# Patient Record
Sex: FEMALE | Race: WHITE | NOT HISPANIC OR LATINO | Employment: OTHER | URBAN - METROPOLITAN AREA
[De-identification: names, ages, dates, MRNs, and addresses within clinical notes are randomized per-mention and may not be internally consistent; named-entity substitution may affect disease eponyms.]

---

## 2023-04-20 ENCOUNTER — NURSING HOME VISIT (OUTPATIENT)
Dept: POST ACUTE CARE | Facility: EXTERNAL LOCATION | Age: 68
End: 2023-04-20
Payer: MEDICARE

## 2023-04-20 DIAGNOSIS — Z01.89 LABORATORY EXAMINATION: ICD-10-CM

## 2023-04-20 DIAGNOSIS — M97.02XD PERIPROSTHETIC FRACTURE AROUND INTERNAL PROSTHETIC LEFT HIP JOINT, SUBSEQUENT ENCOUNTER: ICD-10-CM

## 2023-04-20 DIAGNOSIS — R56.9 SEIZURE (MULTI): ICD-10-CM

## 2023-04-20 DIAGNOSIS — F32.A DEPRESSION, UNSPECIFIED DEPRESSION TYPE: ICD-10-CM

## 2023-04-20 DIAGNOSIS — Z79.899 ENCOUNTER FOR MEDICATION REVIEW: ICD-10-CM

## 2023-04-20 DIAGNOSIS — R26.2 AMBULATORY DYSFUNCTION: ICD-10-CM

## 2023-04-20 DIAGNOSIS — R53.81 PHYSICAL DECONDITIONING: Primary | ICD-10-CM

## 2023-04-20 DIAGNOSIS — E78.5 HYPERLIPIDEMIA, UNSPECIFIED HYPERLIPIDEMIA TYPE: ICD-10-CM

## 2023-04-20 DIAGNOSIS — S62.102D CLOSED FRACTURE OF LEFT WRIST WITH ROUTINE HEALING, SUBSEQUENT ENCOUNTER: ICD-10-CM

## 2023-04-20 DIAGNOSIS — K21.9 GASTROESOPHAGEAL REFLUX DISEASE WITHOUT ESOPHAGITIS: ICD-10-CM

## 2023-04-20 DIAGNOSIS — N32.81 OVERACTIVE BLADDER: ICD-10-CM

## 2023-04-20 PROCEDURE — 99310 SBSQ NF CARE HIGH MDM 45: CPT | Performed by: NURSE PRACTITIONER

## 2023-04-20 NOTE — LETTER
Patient: Marshall Armenta  : 1955    Encounter Date: 2023    Subjective  Patient ID: Marshall Armenta is a 67 y.o. female who is acute skilled care and presents for initial visit for skilled nursing.    HPI   A 67-year-old female with past medical history significant for GERD, chronic back pain secondary to T12 compression fracture,   anxiety/depression who is presenting emergency department chief concern of a fall and c/o pain on left radial aspect of forearm and left hip pain.  She states that during the fall she did not lose consciousness, did not strike her head, does not have any other associated injury.  She is not currently on any blood thinners. She found to have left radial fracture and left femur periprosthetic  fracture. [facility] discharge to  for rehab. She is resting in bed and appears comfortable. See ROS.     Review of Systems   Constitutional:  Negative for fatigue and fever.   Respiratory:  Negative for apnea, cough, chest tightness and shortness of breath.    Cardiovascular:  Negative for chest pain and palpitations.   Gastrointestinal:  Negative for abdominal pain, nausea and vomiting.   Genitourinary:  Negative for dysuria.   Neurological:  Negative for dizziness.   Psychiatric/Behavioral:  Negative for agitation, behavioral problems and confusion.      Objective  Vitals: T 97.7 - 2023 20:04 Route: Forehead (non-contact)  /75 - 2023 20:04 Position: Sitting l/arm  P 78 - 2023 20:04 Pulse Type: Regular  R 18 - 2023 20:04  O2 97 % - 2023 20:04Method: Room Air  H 64.5 in - 2023 18:21 Method: Lying down  W 97.4 lb - 2023 13:33 Scale: Sitting    Physical Exam  Constitutional:       General: She is not in acute distress.     Appearance: She is not ill-appearing.   HENT:      Head: Normocephalic.      Nose: Nose normal.      Mouth/Throat:      Mouth: Mucous membranes are moist.   Eyes:      General: No scleral icterus.     Extraocular Movements: Extraocular  movements intact.      Conjunctiva/sclera: Conjunctivae normal.   Cardiovascular:      Rate and Rhythm: Normal rate and regular rhythm.      Heart sounds: Normal heart sounds. No murmur heard.  Pulmonary:      Effort: Pulmonary effort is normal.      Breath sounds: Normal breath sounds. No wheezing.   Abdominal:      General: Abdomen is flat. Bowel sounds are normal. There is no distension.      Palpations: Abdomen is soft.      Tenderness: There is no abdominal tenderness. There is no guarding or rebound.   Musculoskeletal:      Right lower leg: No edema.      Left lower leg: No edema.      Comments: ROM limited on left hand and left hip   Skin:     General: Skin is warm and dry.      Coloration: Skin is not jaundiced.   Neurological:      Mental Status: She is alert. Mental status is at baseline.   Psychiatric:         Mood and Affect: Mood normal.         Behavior: Behavior normal.       Lab Results   Component Value Date    WBC CANCELED 04/20/2023    HGB CANCELED 04/20/2023    HCT CANCELED 04/20/2023    PLT CANCELED 04/20/2023    ALT 13 04/15/2023    AST 11 04/15/2023    NA CANCELED 04/20/2023    K CANCELED 04/20/2023    CL CANCELED 04/20/2023    CREATININE CANCELED 04/20/2023    BUN CANCELED 04/20/2023    CO2 CANCELED 04/20/2023    TSH 1.11 05/20/2022    INR 1.2 (H) 04/28/2022    HGBA1C 5.0 02/03/2020       Assessment/Plan  #Physical deconditioning  #Ambulatory dysfunction  - continue PT/OT   #Left radial fracture  -CT of the wrist demonstrates a minimally displaced fracture.  This fractureweek treated closed.  Nonweightbearing to the left wrist.  Patient is able toweight-bear through the elbow.  She would need to be fitted for a platform walker.  - follow up with ortho   #Periprosthetic left hip fracture  - hx of s/p left hip hemiarthroplasty  - weight bearing with platform walker.  - She will need a total of 30 days of prophylaxis with lovenox regardless of the treatment  - c/w Lidocaine External Patch 4 %  daily   #Seizure  - c/w Carbatrol Oral Capsule Extended Release 12 Hour 300 MG TID  #Depression  - c/w Citalopram Hydrobromide Oral Tablet 20 MG daily   - c/w Mirtazapine Oral Tablet 30 MG daily   #GERD  - c/w Omeprazole Oral Capsule Delayed Release 20 MG daily  #HLD  - continue Atorvastatin 10 mg PO daily   #Over active bladder  - c/w Oxybutynin Chloride Oral Tablet 5 MG daily   #Med reviewed  #Lab examined     Time spending for 45 minutes   -reviewed of hospital record via EMR and reconciled medication in [facility] and EMR (d/c summary). Reviewed orders, medications, records, and pertinent labs/x-rays from [facility]. Reviewed VS, BS, O2, etc as per protocol. Reviewed and signed off orders, medications, Labs, x-rays, and current diagnoses in [facility]  -Obtained history  -Performing physical examination  -Counseling and educating patient for pain management   -Ordering medications, lab   -Documenting clinical information in the [facility]   -Care coordinating with nursing staff      Electronically Signed By: KYAW Carrillo-CNP   4/23/23  9:36 PM

## 2023-04-23 PROBLEM — R26.2 AMBULATORY DYSFUNCTION: Status: ACTIVE | Noted: 2023-04-23

## 2023-04-23 PROBLEM — R53.81 PHYSICAL DECONDITIONING: Status: ACTIVE | Noted: 2023-04-23

## 2023-04-23 ASSESSMENT — ENCOUNTER SYMPTOMS
DYSURIA: 0
COUGH: 0
CONFUSION: 0
FEVER: 0
DIZZINESS: 0
SHORTNESS OF BREATH: 0
FATIGUE: 0
NAUSEA: 0
PALPITATIONS: 0
VOMITING: 0
AGITATION: 0
APNEA: 0
CHEST TIGHTNESS: 0
ABDOMINAL PAIN: 0

## 2023-04-24 NOTE — PROGRESS NOTES
Subjective   Patient ID: Marshall Armenta is a 67 y.o. female who is acute skilled care and presents for initial visit for skilled nursing.    HPI   A 67-year-old female with past medical history significant for GERD, chronic back pain secondary to T12 compression fracture,   anxiety/depression who is presenting emergency department chief concern of a fall and c/o pain on left radial aspect of forearm and left hip pain.  She states that during the fall she did not lose consciousness, did not strike her head, does not have any other associated injury.  She is not currently on any blood thinners. She found to have left radial fracture and left femur periprosthetic  fracture. [facility] discharge to  for rehab. She is resting in bed and appears comfortable. See ROS.     Review of Systems   Constitutional:  Negative for fatigue and fever.   Respiratory:  Negative for apnea, cough, chest tightness and shortness of breath.    Cardiovascular:  Negative for chest pain and palpitations.   Gastrointestinal:  Negative for abdominal pain, nausea and vomiting.   Genitourinary:  Negative for dysuria.   Neurological:  Negative for dizziness.   Psychiatric/Behavioral:  Negative for agitation, behavioral problems and confusion.      Objective   Vitals: T 97.7 - 4/20/2023 20:04 Route: Forehead (non-contact)  /75 - 4/20/2023 20:04 Position: Sitting l/arm  P 78 - 4/20/2023 20:04 Pulse Type: Regular  R 18 - 4/20/2023 20:04  O2 97 % - 4/20/2023 20:04Method: Room Air  H 64.5 in - 4/19/2023 18:21 Method: Lying down  W 97.4 lb - 4/20/2023 13:33 Scale: Sitting    Physical Exam  Constitutional:       General: She is not in acute distress.     Appearance: She is not ill-appearing.   HENT:      Head: Normocephalic.      Nose: Nose normal.      Mouth/Throat:      Mouth: Mucous membranes are moist.   Eyes:      General: No scleral icterus.     Extraocular Movements: Extraocular movements intact.      Conjunctiva/sclera: Conjunctivae normal.    Cardiovascular:      Rate and Rhythm: Normal rate and regular rhythm.      Heart sounds: Normal heart sounds. No murmur heard.  Pulmonary:      Effort: Pulmonary effort is normal.      Breath sounds: Normal breath sounds. No wheezing.   Abdominal:      General: Abdomen is flat. Bowel sounds are normal. There is no distension.      Palpations: Abdomen is soft.      Tenderness: There is no abdominal tenderness. There is no guarding or rebound.   Musculoskeletal:      Right lower leg: No edema.      Left lower leg: No edema.      Comments: ROM limited on left hand and left hip   Skin:     General: Skin is warm and dry.      Coloration: Skin is not jaundiced.   Neurological:      Mental Status: She is alert. Mental status is at baseline.   Psychiatric:         Mood and Affect: Mood normal.         Behavior: Behavior normal.       Lab Results   Component Value Date    WBC CANCELED 04/20/2023    HGB CANCELED 04/20/2023    HCT CANCELED 04/20/2023    PLT CANCELED 04/20/2023    ALT 13 04/15/2023    AST 11 04/15/2023    NA CANCELED 04/20/2023    K CANCELED 04/20/2023    CL CANCELED 04/20/2023    CREATININE CANCELED 04/20/2023    BUN CANCELED 04/20/2023    CO2 CANCELED 04/20/2023    TSH 1.11 05/20/2022    INR 1.2 (H) 04/28/2022    HGBA1C 5.0 02/03/2020       Assessment/Plan   #Physical deconditioning  #Ambulatory dysfunction  - continue PT/OT   #Left radial fracture  -CT of the wrist demonstrates a minimally displaced fracture.  This fractureweek treated closed.  Nonweightbearing to the left wrist.  Patient is able toweight-bear through the elbow.  She would need to be fitted for a platform walker.  - follow up with ortho   #Periprosthetic left hip fracture  - hx of s/p left hip hemiarthroplasty  - weight bearing with platform walker.  - She will need a total of 30 days of prophylaxis with lovenox regardless of the treatment  - c/w Lidocaine External Patch 4 % daily   #Seizure  - c/w Carbatrol Oral Capsule Extended Release  12 Hour 300 MG TID  #Depression  - c/w Citalopram Hydrobromide Oral Tablet 20 MG daily   - c/w Mirtazapine Oral Tablet 30 MG daily   #GERD  - c/w Omeprazole Oral Capsule Delayed Release 20 MG daily  #HLD  - continue Atorvastatin 10 mg PO daily   #Over active bladder  - c/w Oxybutynin Chloride Oral Tablet 5 MG daily   #Med reviewed  #Lab examined     Time spending for 45 minutes   -reviewed of hospital record via EMR and reconciled medication in [facility] and EMR (d/c summary). Reviewed orders, medications, records, and pertinent labs/x-rays from [facility]. Reviewed VS, BS, O2, etc as per protocol. Reviewed and signed off orders, medications, Labs, x-rays, and current diagnoses in [facility]  -Obtained history  -Performing physical examination  -Counseling and educating patient for pain management   -Ordering medications, lab   -Documenting clinical information in the [facility]   -Care coordinating with nursing staff

## 2023-05-02 ENCOUNTER — PATIENT OUTREACH (OUTPATIENT)
Dept: CARE COORDINATION | Facility: CLINIC | Age: 68
End: 2023-05-02
Payer: MEDICARE

## 2023-05-02 ENCOUNTER — DOCUMENTATION (OUTPATIENT)
Dept: CARE COORDINATION | Facility: CLINIC | Age: 68
End: 2023-05-02
Payer: MEDICARE

## 2023-10-14 PROBLEM — E78.5 HYPERLIPIDEMIA: Status: ACTIVE | Noted: 2023-10-14

## 2023-10-14 PROBLEM — E55.9 VITAMIN D DEFICIENCY: Status: ACTIVE | Noted: 2023-10-14

## 2023-10-14 PROBLEM — M81.0 OSTEOPOROSIS: Status: ACTIVE | Noted: 2023-10-14

## 2023-10-14 PROBLEM — R51.9 HEADACHE: Status: ACTIVE | Noted: 2023-10-14

## 2023-10-14 PROBLEM — N95.2 VAGINAL ATROPHY: Status: ACTIVE | Noted: 2023-10-14

## 2023-10-14 PROBLEM — S42.309A HUMERUS FRACTURE: Status: ACTIVE | Noted: 2023-10-14

## 2023-10-14 PROBLEM — F84.5 ASPERGER'S DISORDER (HHS-HCC): Status: ACTIVE | Noted: 2023-10-14

## 2023-10-14 PROBLEM — D64.9 ANEMIA: Status: ACTIVE | Noted: 2023-10-14

## 2023-10-14 PROBLEM — K58.0 IRRITABLE BOWEL SYNDROME WITH DIARRHEA: Status: ACTIVE | Noted: 2023-10-14

## 2023-10-14 PROBLEM — R79.89 ABNORMAL CBC: Status: ACTIVE | Noted: 2023-10-14

## 2023-10-14 PROBLEM — K86.2 PANCREATIC CYST (HHS-HCC): Status: ACTIVE | Noted: 2023-10-14

## 2023-10-14 PROBLEM — R01.1 HEART MURMUR: Status: ACTIVE | Noted: 2023-10-14

## 2023-10-14 PROBLEM — R10.33 ABDOMINAL PAIN, PERIUMBILICAL: Status: ACTIVE | Noted: 2023-10-14

## 2023-10-14 PROBLEM — I78.1 NEVUS, NON-NEOPLASTIC: Status: ACTIVE | Noted: 2018-10-25

## 2023-10-14 PROBLEM — G40.409 GRAND MAL SEIZURE (MULTI): Status: ACTIVE | Noted: 2023-10-14

## 2023-10-14 PROBLEM — N85.8 UTERINE MASS: Status: ACTIVE | Noted: 2023-10-14

## 2023-10-14 PROBLEM — R13.10 ODYNOPHAGIA: Status: ACTIVE | Noted: 2023-10-14

## 2023-10-14 PROBLEM — D12.6 TUBULAR ADENOMA OF COLON: Status: ACTIVE | Noted: 2023-10-14

## 2023-10-14 PROBLEM — G25.81 RESTLESS LEG SYNDROME: Status: ACTIVE | Noted: 2023-10-14

## 2023-10-14 PROBLEM — R19.02 ABDOMINAL MASS, LUQ (LEFT UPPER QUADRANT): Status: ACTIVE | Noted: 2023-10-14

## 2023-10-14 PROBLEM — F41.9 ANXIETY DISORDER: Status: ACTIVE | Noted: 2023-10-14

## 2023-10-14 PROBLEM — S52.502A DISTAL RADIUS FRACTURE, LEFT: Status: ACTIVE | Noted: 2023-10-14

## 2023-10-14 PROBLEM — K59.09 CHRONIC CONSTIPATION: Status: ACTIVE | Noted: 2023-10-14

## 2023-10-14 PROBLEM — L82.1 OTHER SEBORRHEIC KERATOSIS: Status: ACTIVE | Noted: 2018-10-25

## 2023-10-14 PROBLEM — S52.572D OTHER INTRAARTICULAR FRACTURE OF LOWER END OF LEFT RADIUS, SUBSEQUENT ENCOUNTER FOR CLOSED FRACTURE WITH ROUTINE HEALING: Status: ACTIVE | Noted: 2023-10-14

## 2023-10-14 PROBLEM — R53.83 FATIGUE: Status: ACTIVE | Noted: 2023-10-14

## 2023-10-14 PROBLEM — N95.1 HOT FLASHES, MENOPAUSAL: Status: ACTIVE | Noted: 2023-10-14

## 2023-10-14 PROBLEM — M97.8XXD: Status: ACTIVE | Noted: 2023-10-14

## 2023-10-14 PROBLEM — Z96.649: Status: ACTIVE | Noted: 2023-10-14

## 2023-10-14 PROBLEM — R13.10 DYSPHAGIA: Status: ACTIVE | Noted: 2023-10-14

## 2023-10-14 PROBLEM — D18.01 HEMANGIOMA OF SKIN AND SUBCUTANEOUS TISSUE: Status: ACTIVE | Noted: 2018-10-25

## 2023-10-14 PROBLEM — M54.9 CHRONIC BACK PAIN: Status: ACTIVE | Noted: 2023-10-14

## 2023-10-14 PROBLEM — M25.532 LEFT WRIST PAIN: Status: ACTIVE | Noted: 2023-10-14

## 2023-10-14 PROBLEM — S52.236D: Status: ACTIVE | Noted: 2023-10-14

## 2023-10-14 PROBLEM — K58.1 IRRITABLE BOWEL SYNDROME WITH CONSTIPATION: Status: ACTIVE | Noted: 2023-10-14

## 2023-10-14 PROBLEM — S52.509D TRAUMATIC CLOSED DISPLACED FRACTURE OF DISTAL END OF RADIUS WITH ROUTINE HEALING: Status: ACTIVE | Noted: 2023-10-14

## 2023-10-14 PROBLEM — L81.4 OTHER MELANIN HYPERPIGMENTATION: Status: ACTIVE | Noted: 2018-10-25

## 2023-10-14 PROBLEM — G89.29 CHRONIC BACK PAIN: Status: ACTIVE | Noted: 2023-10-14

## 2023-10-14 PROBLEM — S22.081A BURST FRACTURE OF T12 VERTEBRA (MULTI): Status: ACTIVE | Noted: 2023-10-14

## 2023-10-14 PROBLEM — M25.512 SHOULDER PAIN, LEFT: Status: ACTIVE | Noted: 2023-10-14

## 2023-10-14 PROBLEM — R31.9 HEMATURIA: Status: ACTIVE | Noted: 2023-10-14

## 2023-10-14 PROBLEM — F32.A DEPRESSION: Status: ACTIVE | Noted: 2023-10-14

## 2023-10-14 PROBLEM — E46 MALNUTRITION (MULTI): Status: ACTIVE | Noted: 2023-10-14

## 2023-10-14 PROBLEM — R10.13 EPIGASTRIC ABDOMINAL PAIN: Status: ACTIVE | Noted: 2023-10-14

## 2023-10-14 PROBLEM — R35.0 INCREASED FREQUENCY OF URINATION: Status: ACTIVE | Noted: 2023-10-14

## 2023-10-14 RX ORDER — ASPIRIN 81 MG/1
TABLET ORAL
COMMUNITY
Start: 2017-06-26 | End: 2023-11-28 | Stop reason: WASHOUT

## 2023-10-14 RX ORDER — RISPERIDONE 0.5 MG/1
0.5 TABLET ORAL NIGHTLY
COMMUNITY
End: 2023-10-16 | Stop reason: SDUPTHER

## 2023-10-14 RX ORDER — LORATADINE 10 MG/1
10 TABLET ORAL DAILY PRN
COMMUNITY
End: 2023-11-28 | Stop reason: WASHOUT

## 2023-10-14 RX ORDER — OMEGA-3 FATTY ACIDS/FISH OIL 340-1000MG
1 CAPSULE ORAL DAILY
COMMUNITY

## 2023-10-14 RX ORDER — PROGESTERONE 100 MG/1
100 CAPSULE ORAL DAILY
COMMUNITY
End: 2023-11-28 | Stop reason: WASHOUT

## 2023-10-14 RX ORDER — MIRTAZAPINE 15 MG/1
15 TABLET, FILM COATED ORAL NIGHTLY
COMMUNITY
End: 2023-10-16 | Stop reason: WASHOUT

## 2023-10-14 RX ORDER — OMEPRAZOLE 20 MG/1
20 CAPSULE, DELAYED RELEASE ORAL DAILY
COMMUNITY
End: 2023-11-28 | Stop reason: WASHOUT

## 2023-10-14 RX ORDER — GABAPENTIN 300 MG/1
300 CAPSULE ORAL NIGHTLY
COMMUNITY
Start: 2021-05-25 | End: 2023-11-28 | Stop reason: WASHOUT

## 2023-10-14 RX ORDER — ADHESIVE BANDAGE
30 BANDAGE TOPICAL NIGHTLY PRN
COMMUNITY
End: 2023-11-28 | Stop reason: WASHOUT

## 2023-10-14 RX ORDER — CARBAMAZEPINE 300 MG/1
300 CAPSULE, EXTENDED RELEASE ORAL 3 TIMES DAILY
COMMUNITY
Start: 2012-10-24 | End: 2024-05-13 | Stop reason: SDUPTHER

## 2023-10-14 RX ORDER — CITALOPRAM 40 MG/1
1 TABLET, FILM COATED ORAL DAILY
COMMUNITY
End: 2023-10-16 | Stop reason: WASHOUT

## 2023-10-14 RX ORDER — ESTRADIOL 0.5 MG/1
1 TABLET ORAL DAILY
COMMUNITY
End: 2023-11-28 | Stop reason: WASHOUT

## 2023-10-14 RX ORDER — TRAZODONE HYDROCHLORIDE 50 MG/1
1 TABLET ORAL NIGHTLY
COMMUNITY
End: 2023-10-16 | Stop reason: WASHOUT

## 2023-10-14 RX ORDER — DOCUSATE SODIUM 100 MG/1
100 CAPSULE, LIQUID FILLED ORAL 2 TIMES DAILY
COMMUNITY
Start: 2022-05-03 | End: 2023-11-28 | Stop reason: WASHOUT

## 2023-10-14 RX ORDER — MIRTAZAPINE 45 MG/1
0.5 TABLET, FILM COATED ORAL NIGHTLY
COMMUNITY
Start: 2021-07-19 | End: 2024-03-11 | Stop reason: WASHOUT

## 2023-10-14 RX ORDER — MULTIVITAMIN
1 TABLET ORAL DAILY
COMMUNITY

## 2023-10-14 RX ORDER — SIMVASTATIN 20 MG/1
1 TABLET, FILM COATED ORAL DAILY
COMMUNITY
Start: 2013-09-30

## 2023-10-14 RX ORDER — PHENAZOPYRIDINE HYDROCHLORIDE 200 MG/1
TABLET, FILM COATED ORAL 2 TIMES DAILY
COMMUNITY
Start: 2023-01-27 | End: 2023-11-28 | Stop reason: WASHOUT

## 2023-10-14 RX ORDER — OMEPRAZOLE 40 MG/1
1 CAPSULE, DELAYED RELEASE ORAL DAILY
COMMUNITY
Start: 2021-11-04 | End: 2023-11-28 | Stop reason: WASHOUT

## 2023-10-14 RX ORDER — POLYETHYLENE GLYCOL 3350 17 G/17G
17 POWDER, FOR SOLUTION ORAL DAILY
COMMUNITY
Start: 2022-05-03 | End: 2023-11-28 | Stop reason: WASHOUT

## 2023-10-14 RX ORDER — ALUMINUM HYDROXIDE, MAGNESIUM HYDROXIDE, AND SIMETHICONE 1200; 120; 1200 MG/30ML; MG/30ML; MG/30ML
SUSPENSION ORAL
COMMUNITY
End: 2023-11-28 | Stop reason: WASHOUT

## 2023-10-14 RX ORDER — CITALOPRAM 20 MG/1
1 TABLET, FILM COATED ORAL DAILY
COMMUNITY
Start: 2022-04-18 | End: 2023-11-28 | Stop reason: SDUPTHER

## 2023-10-14 RX ORDER — OXYBUTYNIN CHLORIDE 10 MG/1
1 TABLET, EXTENDED RELEASE ORAL DAILY
COMMUNITY

## 2023-10-14 RX ORDER — ACETAMINOPHEN 325 MG/1
650 TABLET ORAL EVERY 4 HOURS PRN
COMMUNITY
Start: 2020-02-03

## 2023-10-16 ENCOUNTER — TELEMEDICINE (OUTPATIENT)
Dept: BEHAVIORAL HEALTH | Facility: CLINIC | Age: 68
End: 2023-10-16
Payer: MEDICARE

## 2023-10-16 DIAGNOSIS — F41.1 GAD (GENERALIZED ANXIETY DISORDER): ICD-10-CM

## 2023-10-16 DIAGNOSIS — F33.40 RECURRENT MAJOR DEPRESSIVE DISORDER, IN REMISSION (CMS-HCC): ICD-10-CM

## 2023-10-16 DIAGNOSIS — F42.9 OBSESSIVE-COMPULSIVE DISORDER, UNSPECIFIED TYPE: ICD-10-CM

## 2023-10-16 PROBLEM — F32.9 MAJOR DEPRESSION: Status: ACTIVE | Noted: 2023-10-14

## 2023-10-16 PROCEDURE — 99214 OFFICE O/P EST MOD 30 MIN: CPT | Performed by: PSYCHIATRY & NEUROLOGY

## 2023-10-16 RX ORDER — RISPERIDONE 0.25 MG/1
0.75 TABLET ORAL NIGHTLY
Qty: 90 TABLET | Refills: 1 | Status: SHIPPED | OUTPATIENT
Start: 2023-10-16 | End: 2023-11-28 | Stop reason: SDUPTHER

## 2023-10-16 NOTE — PATIENT INSTRUCTIONS
Safety plan reviewed with patient. If there are any thoughts of harming your self, harming others, concerning worsening of your mental health symptoms or concerning medication side effects, please call [phone]. [phone] or reports to the nearest emergency room.   Reviewed r/b/a of medications, side effects reviewed, patient gives informed consent for each medication. Patient understands to report to the er/urgent care if there are concerning medication side effects.

## 2023-10-16 NOTE — PROGRESS NOTES
Outpatient Psychiatry          Reason for Visit: follow up for PDD, mild-mod; OCD; AIYANA; r/o cog disorder . Caregiver notes she is more social, doing something every day, decreased excessive calling with complaints.      HPI:69 y/o single female who has been treated for depression and anxiety during her adult life.. She is tolerating remeron and Risperdal and no SE.. Denies med SE. Estimates mood is good about 75% of the time. Continues to be  more active, going on walks, more social and engaging more in activities, however she still finds that she often has to push self to do things,  Does word trivia and word games.   She is writing down activities that she plans to do.   There is decreased perseveration on worry. Caregiver reports excessive calls reduced by 80%.    She is taking balance class.   Sleeps well most nights, appetite is ok. she is holding at 82 pounds   There is chronic worry- BM, worries she may fall, will not get to the bathroom on time. somatic complaints   There is no death wish or SI, hallucinations, paranoia, alfonso, wants things to be perfect and neat , counts to 50, has to check things many times, takes pills in and out of pill box.    she does not want to go to a nursing home.  2022 at ER as she she had inadvertently OD on tegretol, 2022 she fell and bumper her head which resulted in an ER visit.    PAST PSYCH H/O: last saw a psychiatrist over  saw an NP in May 2021 X 1 and was prescribed abilify which she did not take   PAST PSYCH HOSP: none   PAST SELF HARM: none   past psych meds: abilify, celexa (since ) , trazadone , gabapentin , elavil , buspar , risperdal     DANI: Denies all substance abuse   FAMILY PSYCH H/O: brother - schizophrenia , alcohol abuse     reviewed past med h/o, meds, allergies    SOCIAL H/O: parents were optometrists, one living brother -, one  brother. associates degree, worked for parents in the past- secretarial (had difficulty working as  she was frequently depressed/ tired). Never , no children. She has never been able to be fully independent - unclear what the etiology is.        Current Medications:    Current Outpatient Medications:     acetaminophen (Tylenol) 325 mg tablet, Take 2 tablets (650 mg) by mouth every 4 hours if needed., Disp: , Rfl:     alum-mag hydroxide-simeth (Antacid) 200-200-20 mg/5 mL oral suspension, Take by mouth., Disp: , Rfl:     aspirin 81 mg EC tablet, Take by mouth., Disp: , Rfl:     carBAMazepine (CarbatroL) 300 mg 12 hr capsule, Take 1 capsule (300 mg) by mouth 3 times a day., Disp: , Rfl:     citalopram (CeleXA) 20 mg tablet, Take 1 tablet (20 mg) by mouth once daily., Disp: , Rfl:     citalopram (CeleXA) 40 mg tablet, Take 1 tablet (40 mg) by mouth once daily., Disp: , Rfl:     docusate sodium (Colace) 100 mg capsule, Take 1 capsule (100 mg) by mouth twice a day., Disp: , Rfl:     estradiol (Estrace) 0.5 mg tablet, Take 1 tablet (0.5 mg) by mouth once daily., Disp: , Rfl:     gabapentin (Neurontin) 300 mg capsule, Take 1 capsule (300 mg) by mouth once daily at bedtime.  CAN INCREASE TO UP TO 1 CAPSULE THREE TIMES DAILY, Disp: , Rfl:     loratadine (Claritin) 10 mg tablet, Take 1 tablet (10 mg) by mouth once daily as needed., Disp: , Rfl:     magnesium hydroxide (Milk of Magnesia) 400 mg/5 mL suspension, Take 30 mL by mouth as needed at bedtime., Disp: , Rfl:     mirtazapine (Remeron) 15 mg tablet, Take 1 tablet (15 mg) by mouth once daily at bedtime., Disp: , Rfl:     mirtazapine (Remeron) 45 mg tablet, Take 0.5 tablets (22.5 mg) by mouth once daily at bedtime., Disp: , Rfl:     multivitamin tablet, Take 1 tablet by mouth once daily., Disp: , Rfl:     omega-3 fatty acids-fish oil (Fish OiL) 340-1,000 mg capsule, Take 1 capsule by mouth once daily., Disp: , Rfl:     omeprazole (PriLOSEC) 20 mg DR capsule, Take 1 capsule (20 mg) by mouth once daily., Disp: , Rfl:     omeprazole (PriLOSEC) 40 mg DR capsule, Take  1 capsule (40 mg) by mouth once daily., Disp: , Rfl:     oxybutynin XL (Ditropan-XL) 10 mg 24 hr tablet, Take 1 tablet (10 mg) by mouth once daily., Disp: , Rfl:     phenazopyridine (Pyridium) 200 mg tablet, Take by mouth twice a day., Disp: , Rfl:     polyethylene glycol (Glycolax, Miralax) 17 gram/dose powder, Take 17 g by mouth once daily., Disp: , Rfl:     progesterone (Prometrium) 100 mg capsule, Take 1 capsule (100 mg) by mouth once daily., Disp: , Rfl:     raNITIdine (Zantac) 150 mg tablet, Take 1 tablet (150 mg) by mouth once daily at bedtime., Disp: , Rfl:     risperiDONE (RisperDAL) 0.5 mg tablet, Take 1 tablet (0.5 mg) by mouth once daily at bedtime., Disp: , Rfl:     simvastatin (Zocor) 20 mg tablet, Take 1 tablet (20 mg) by mouth once daily., Disp: , Rfl:     traZODone (Desyrel) 50 mg tablet, Take 1 tablet (50 mg) by mouth once daily at bedtime., Disp: , Rfl:   Medical History:  Past Medical History:   Diagnosis Date    Asymptomatic menopausal state     Menopause    Epilepsy, unspecified, not intractable, without status epilepticus (CMS/[facility]) 11/07/2022    Epilepsy    Fracture of right shoulder girdle, part unspecified, initial encounter for closed fracture     Fracture of right shoulder     Surgical History:  Past Surgical History:   Procedure Laterality Date    KNEE ARTHROSCOPY W/ DEBRIDEMENT  02/13/2014    Arthroscopy Knee    MR HEAD ANGIO WO IV CONTRAST  12/15/2022    MR HEAD ANGIO WO IV CONTRAST 12/15/2022 [facility]CTOR OFF[facility]    MR NECK ANGIO WO IV CONTRAST  12/15/2022    MR NECK ANGIO WO IV CONTRAST 12/15/2022 DOCTOR OFFICE LEGACY    TONSILLECTOMY  02/13/2014    Tonsillectomy With Adenoidectomy     Family History:  Family History   Problem Relation Name Age of Onset    Hyperlipidemia Mother      Hypertension Mother      Irritable bowel syndrome Mother       Social History:  Social History     Socioeconomic History    Marital status: Single     Spouse name: Not on file    Number of children: Not on  file    Years of education: Not on file    Highest education level: Not on file   Occupational History    Not on file   Tobacco Use    Smoking status: Not on file    Smokeless tobacco: Not on file   Substance and Sexual Activity    Alcohol use: Not on file    Drug use: Not on file    Sexual activity: Not on file   Other Topics Concern    Not on file   Social History Narrative    Not on file     Social Determinants of Health     Financial Resource Strain: Not on file   Food Insecurity: Not on file   Transportation Needs: Not on file   Physical Activity: Not on file   Stress: Not on file   Social Connections: Not on file   Intimate Partner Violence: Not on file   Housing Stability: Not on file       Medical Review Of Systems:  Pertinent items are noted in HPI.    Psychiatric Review Of Systems:  Psychiatric ROS - Adult  Anxiety: General Anxiety Disorder (AIYANA)AIYANA Behaviors: difficult to control worry, excessive anxiety/worry, difficulty concentrating, and irritability  Depression: most of the time mood is ok, able to enjoy things   Delirium: negative  Psychosis: negative  Codi: negative  Safety Issues: none  Psychiatric ROS Comment: less persistent rumination/worry  Disordered Eating Symptoms:n/a   Inattentive Symptoms: n/a    Hyperactive/Impulsive Symptoms:n/a     Trauma Symptoms:n/a       There were no vitals taken for this visit.     LABS  Lab Results   Component Value Date    TSH 1.11 05/20/2022    [id number] 1,582 (H) 04/27/2022        Chemistry    Lab Results   Component Value Date/Time    NA CANCELED 04/20/2023 0204    K CANCELED 04/20/2023 0204    CL CANCELED 04/20/2023 0204    CO2 CANCELED 04/20/2023 0204    BUN CANCELED 04/20/2023 0204    CREATININE CANCELED 04/20/2023 0204    Lab Results   Component Value Date/Time    CALCIUM CANCELED 04/20/2023 0204    ALKPHOS 56 04/15/2023 1508    AST 11 04/15/2023 1508    ALT 13 04/15/2023 1508    BILITOT 0.3 04/15/2023 1508        ains abnormal data LIPID PANEL  BASIC  Order: [id number]  Component  Ref Range & Units 1 mo ago Resulting Agency   Cholesterol, Total  <200 mg/dL 203 [facility]  [facility]   Comment: <200 mg/dL, Desirable   200-239 mg/dL, Borderline high  >239 mg/dL, High   Triglyceride  <150 mg/dL 50 [facility]   Comment: <150 mg/dL, Normal   150-199 mg/dL, Borderline high   200-499 mg/dL, High  >499 mg/dL, Very high   HDL Cholesterol  >39 mg/dL 99 [facility]   Comment:  40-59 mg/dL, Acceptable  >59 mg/dL, High: Negative risk factor for coronary heart disease  <40 mg/dL, Low: Positive risk factor for coronary heart disease   Non HDL Cholesterol  <130 mg/dL 104 [facility]   Comment: <130 mg/dL, Optimal   130-159 mg/dL, Near optimal/above optimal   160-189 mg/dL, Borderline high   190-219 mg/dL, High  >219 mg/dL, Very high  Secondary prevention optimal non HDL Cholesterol levels are recommended to be <100 mg/dL   Fasting Time  hrs 12 [facility]   VLDL Cholesterol  <30 mg/dL 10 [facility]   TC:HDL Ratio  <5.10 2.05 [facility]   LDL Cholesterol  <100 mg/dL 94 [facility]   Comment: <100 mg/dL, Optimal   100-129 mg/dL, Near optimal/above optimal   130-159 mg/dL, Borderline high   160-189 mg/dL, High  >189 mg/dL, Very high  Secondary prevention optimal LDL Cholesterol levels are recommended to be < 70 mg/dL   LDL:HDL Ratio  <2.54 0.95 [facility]          Specimen Collected: 09/02/23 10:10       Objective   Mental Status Exam:  Mental Status Examination  General Appearance: Well groomed, appropriate eye contact.  Gait/Station: n/a  Speech: Normal rate, volume, prosody  Mood: fine  Affect: Constricted  Thought Process: Linear, goal directed  Thought Associations: Moderate loosening of associations  Thought Content: normal  Perception: No perceptual abnormalities noted  Level of Consciousness:  Alert  Orientation: Alert and oriented to person, place, time and situation  Attention and Concentration: Intact  Recent Memory: fair   Remote Memory: caregiver helps with historical details   Executive function: needs assistance with IADLs   Language: no clear deficits, at baseline   Fund of knowledge: fair   Insight :fair   Judgment: fair         Assessment/Plan   67 y/o single female who presented 7/19/2021 with a long h/o excessive worry and there are associated somatic symptoms , OCD symptoms, depression, fatigue. She is disabled due to functional impairment - not able to be fully independent (? cognitive disorder, autistic spectrum ? ). Chronic complaints include anxiety/depression, fatigue, poor motivation. Addition of remeron had been partially helpful with mood and anxiety and recent addition of risperdal has helped with mood/negative rumination. The patient would like to increase risperdal to evaluate if there is further improvement in mood and anxiety. No med SE, no involuntary abnormal movements. There is no psychosis, death wish, SI, HI. She lives in [facility] and her friend Raul is very helpful.     She in on tegretol for seizures, recently saw neurology.   She is a poor historian and her friend clarifies historical details.       dx: PDD, mild-mod; OCD; AIYANA; r/o cog disorder     1) continue remeron 30 mg at night , citalopram 20 mg , increase risperdal to 0.75 mg at night, reviewed r/b/a including metabolic monitoring and TD risk , patient gives IC   2) consider neuropsych testing to characterize cognitive difficulties   3) available labs reviewed, asked that labs forwarded to me as there was recent lipid panel completed   4) f/u 6 weeks , suggest starting counseling (recs given)   Patient Active Problem List   Diagnosis    Physical deconditioning    Ambulatory dysfunction    Abnormal CBC    Anemia    Anxiety disorder    Asperger's disorder    Chronic back pain    Closed nondisplaced oblique fracture of  shaft of ulna with routine healing    Depression    Dysphagia    Abdominal pain, periumbilical    Epigastric abdominal pain    Fatigue    Grand mal seizure (CMS/HCC)    Headache    Heart murmur    Hemangioma of skin and subcutaneous tissue    Hematuria    Hot flashes, menopausal    Humerus fracture    Hyperlipidemia    Increased frequency of urination    Chronic constipation    Irritable bowel syndrome with constipation    Irritable bowel syndrome with diarrhea    Malnutrition (CMS/HCC)    Nevus, non-neoplastic    Osteoporosis    Other melanin hyperpigmentation    Other seborrheic keratosis    Pancreatic cyst    Restless leg syndrome    Shoulder pain, left    Closed traumatic displaced fracture of distal end of radius with routine healing, subsequent encounter    Tubular adenoma of colon    Abdominal mass, LUQ (left upper quadrant)    Uterine mass    Vaginal atrophy    Vitamin D deficiency    Burst fracture of T12 vertebra (CMS/HCC)    Distal radius fracture, left    Left wrist pain    Odynophagia    Other intraarticular fracture of lower end of left radius, subsequent encounter for closed fracture with routine healing    Periprosthetic hip fracture, subsequent encounter       Follow-up plan for depression was discussed with patient    Review with patient: Treatment plan reviewed with the patient.  Medication risks/benefit reviewed with the patient  Psychiatric Risk Assessment  Violence Risk Assessment: none  Acute Risk of Harm to Others is Considered: low   Suicide Risk Assessment: age > 65 yrs old and   Protective Factors against Suicide: adherence to  treatment, moral objections to suicide, and social support/connectedness  Acute Risk of Harm to Self is Considered: low    Rukhsana Kim DO

## 2023-11-28 ENCOUNTER — TELEMEDICINE (OUTPATIENT)
Dept: BEHAVIORAL HEALTH | Facility: CLINIC | Age: 68
End: 2023-11-28
Payer: MEDICARE

## 2023-11-28 DIAGNOSIS — F41.1 GAD (GENERALIZED ANXIETY DISORDER): ICD-10-CM

## 2023-11-28 DIAGNOSIS — F42.9 OBSESSIVE-COMPULSIVE DISORDER, UNSPECIFIED TYPE: ICD-10-CM

## 2023-11-28 DIAGNOSIS — F33.40 RECURRENT MAJOR DEPRESSIVE DISORDER, IN REMISSION (CMS-HCC): ICD-10-CM

## 2023-11-28 DIAGNOSIS — F33.9 RECURRENT MAJOR DEPRESSIVE DISORDER, REMISSION STATUS UNSPECIFIED (CMS-HCC): ICD-10-CM

## 2023-11-28 PROCEDURE — 99214 OFFICE O/P EST MOD 30 MIN: CPT | Performed by: PSYCHIATRY & NEUROLOGY

## 2023-11-28 RX ORDER — RISPERIDONE 0.25 MG/1
0.75 TABLET ORAL NIGHTLY
Qty: 90 TABLET | Refills: 2 | Status: SHIPPED | OUTPATIENT
Start: 2023-11-28 | End: 2024-03-11 | Stop reason: SDUPTHER

## 2023-11-28 RX ORDER — CITALOPRAM 20 MG/1
20 TABLET, FILM COATED ORAL DAILY
Qty: 30 TABLET | Refills: 2 | Status: SHIPPED | OUTPATIENT
Start: 2023-11-28 | End: 2024-03-11 | Stop reason: SDUPTHER

## 2023-11-28 NOTE — PATIENT INSTRUCTIONS
- please follow up in about 8 weeks  -no treatment changes.     -If there are any thoughts of harming your self, harming others, concerning worsening of your mental health symptoms or concerning medication side effects, please call [phone] or reports to the nearest emergency room.

## 2023-11-28 NOTE — PROGRESS NOTES
"Outpatient Psychiatry          Reason for Visit: follow up for PDD, mild-mod; OCD; AIYANA; r/o cog disorder .  \"I am pretty much the same, doing more things\" . Caregiver notes she is laughing, talking, does not call others excessively with ruminative/perseverative thought content      HPI:67 y/o single female who has been treated for depression and anxiety during her adult life.. She is tolerating remeron and Risperdal and no SE. Mood has been fine and not feeling down persistently. States she is lazy.     Continues to be  more active, going on walks, more social and engaging more in activities.  Does word trivia and word games. Continues in balance class .  She even ran a trivia activity when the leader did not show up.   There is still chronic worry which she feels impacts her motivation to do things.    Denies new health issues.   Sleeps well , appetite is ok. she is holding at 82 pounds     There is no death wish or SI, hallucinations, paranoia, alfonso, wants things to be perfect and neat , counts to 50, has to check things many times, takes pills in and out of pill box.    she does not want to go to a nursing home.  2022 at ER as she she had inadvertently OD on tegretol, 2022 she fell and bumper her head which resulted in an ER visit.    PAST PSYCH H/O: last saw a psychiatrist over  saw an NP in May 2021 X 1 and was prescribed abilify which she did not take   PAST PSYCH HOSP: none   PAST SELF HARM: none   past psych meds: abilify, celexa (since ) , trazadone , gabapentin , elavil , buspar , risperdal     DANI: Denies all substance abuse   FAMILY PSYCH H/O: brother - schizophrenia , alcohol abuse     reviewed past med h/o, meds, allergies    SOCIAL H/O: parents were optometrists, one living brother -, one  brother. 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 Never , no children. She has never been " able to be fully independent - unclear what the etiology is.        Current Medications:    Current Outpatient Medications:     acetaminophen (Tylenol) 325 mg tablet, Take 2 tablets (650 mg) by mouth every 4 hours if needed., Disp: , Rfl:     alum-mag hydroxide-simeth (Antacid) 200-200-20 mg/5 mL oral suspension, Take by mouth., Disp: , Rfl:     aspirin 81 mg EC tablet, Take by mouth., Disp: , Rfl:     carBAMazepine (CarbatroL) 300 mg 12 hr capsule, Take 1 capsule (300 mg) by mouth 3 times a day., Disp: , Rfl:     citalopram (CeleXA) 20 mg tablet, Take 1 tablet (20 mg) by mouth once daily., Disp: , Rfl:     docusate sodium (Colace) 100 mg capsule, Take 1 capsule (100 mg) by mouth twice a day., Disp: , Rfl:     estradiol (Estrace) 0.5 mg tablet, Take 1 tablet (0.5 mg) by mouth once daily., Disp: , Rfl:     gabapentin (Neurontin) 300 mg capsule, Take 1 capsule (300 mg) by mouth once daily at bedtime.  CAN INCREASE TO UP TO 1 CAPSULE THREE TIMES DAILY, Disp: , Rfl:     loratadine (Claritin) 10 mg tablet, Take 1 tablet (10 mg) by mouth once daily as needed., Disp: , Rfl:     magnesium hydroxide (Milk of Magnesia) 400 mg/5 mL suspension, Take 30 mL by mouth as needed at bedtime., Disp: , Rfl:     mirtazapine (Remeron) 45 mg tablet, Take 0.5 tablets (22.5 mg) by mouth once daily at bedtime., Disp: , Rfl:     multivitamin tablet, Take 1 tablet by mouth once daily., Disp: , Rfl:     omega-3 fatty acids-fish oil (Fish OiL) 340-1,000 mg capsule, Take 1 capsule by mouth once daily., Disp: , Rfl:     omeprazole (PriLOSEC) 20 mg DR capsule, Take 1 capsule (20 mg) by mouth once daily., Disp: , Rfl:     omeprazole (PriLOSEC) 40 mg DR capsule, Take 1 capsule (40 mg) by mouth once daily., Disp: , Rfl:     oxybutynin XL (Ditropan-XL) 10 mg 24 hr tablet, Take 1 tablet (10 mg) by mouth once daily., Disp: , Rfl:     phenazopyridine (Pyridium) 200 mg tablet, Take by mouth twice a day., Disp: , Rfl:     polyethylene glycol (Glycolax,  Miralax) 17 gram/dose powder, Take 17 g by mouth once daily., Disp: , Rfl:     progesterone (Prometrium) 100 mg capsule, Take 1 capsule (100 mg) by mouth once daily., Disp: , Rfl:     raNITIdine (Zantac) 150 mg tablet, Take 1 tablet (150 mg) by mouth once daily at bedtime., Disp: , Rfl:     risperiDONE (RisperDAL) 0.25 mg tablet, Take 3 tablets (0.75 mg) by mouth once daily at bedtime., Disp: 90 tablet, Rfl: 1    simvastatin (Zocor) 20 mg tablet, Take 1 tablet (20 mg) by mouth once daily., Disp: , Rfl:   Medical History:  Past Medical History:   Diagnosis Date    Asymptomatic menopausal state     Menopause    Epilepsy, unspecified, not intractable, without status epilepticus (CMS/[facility]) 11/07/2022    Epilepsy    Fracture of right shoulder girdle, part unspecified, initial encounter for closed fracture     Fracture of right shoulder     Surgical History:  Past Surgical History:   Procedure Laterality Date    KNEE ARTHROSCOPY W/ DEBRIDEMENT  02/13/2014    Arthroscopy Knee    MR HEAD ANGIO WO IV CONTRAST  12/15/2022    MR HEAD ANGIO WO IV CONTRAST 12/15/2022 [facility]    MR NECK ANGIO WO IV CONTRAST  12/15/2022    MR NECK ANGIO WO IV CONTRAST 12/15/2022 DOCTOR OFFICE [facility]    TONSILLECTOMY  02/13/2014    Tonsillectomy With Adenoidectomy     Family History:  Family History   Problem Relation Name Age of Onset    Hyperlipidemia Mother      Hypertension Mother      Irritable bowel syndrome Mother       Social History:  Social History     Socioeconomic History    Marital status: Single     Spouse name: Not on file    Number of children: Not on file    Years of education: Not on file    Highest education level: Not on file   Occupational History    Not on file   Tobacco Use    Smoking status: Never    Smokeless tobacco: Never   Substance and Sexual Activity    Alcohol use: Not on file    Drug use: Not on file    Sexual activity: Not on file   Other Topics Concern    Not on file   Social History Narrative    Not on  file     Social Determinants of Health     Financial Resource Strain: Not on file   Food Insecurity: Not on file   Transportation Needs: Not on file   Physical Activity: Not on file   Stress: Not on file   Social Connections: Not on file   Intimate Partner Violence: Not on file   Housing Stability: Not on file       Medical Review Of Systems:  Pertinent items are noted in HPI.    Psychiatric Review Of Systems:  Psychiatric ROS - Adult  Anxiety: General Anxiety Disorder (AIYANA)AIYANA Behaviors: difficult to control worry, excessive anxiety/worry, difficulty concentrating, and irritability  Depression: most of the time mood is ok, able to enjoy things   Delirium: negative  Psychosis: negative  Codi: negative  Safety Issues: none  Psychiatric ROS Comment: less persistent rumination/worry  Disordered Eating Symptoms:n/a   Inattentive Symptoms: n/a    Hyperactive/Impulsive Symptoms:n/a     Trauma Symptoms:n/a       There were no vitals taken for this visit.     LABS  Lab Results   Component Value Date    TSH 1.11 05/20/2022    WWFLIRTB35 1,582 (H) 04/27/2022        Chemistry    Lab Results   Component Value Date/Time    NA CANCELED 04/20/2023 0204    K CANCELED 04/20/2023 0204    CL CANCELED 04/20/2023 0204    CO2 CANCELED 04/20/2023 0204    BUN CANCELED 04/20/2023 0204    CREATININE CANCELED 04/20/2023 0204    Lab Results   Component Value Date/Time    CALCIUM CANCELED 04/20/2023 0204    ALKPHOS 56 04/15/2023 1508    AST 11 04/15/2023 1508    ALT 13 04/15/2023 1508    BILITOT 0.3 04/15/2023 1508        ains abnormal data LIPID PANEL BASIC  Order: [id number]  Component  Ref Range & Units 1 mo ago Resulting Agency   Cholesterol, Total  <200 mg/dL 203 [facility]   Comment: <200 mg/dL, Desirable   200-239 mg/dL, Borderline high  >239 mg/dL, High   Triglyceride  <150 mg/dL 50 [facility]   Comment: <150 mg/dL, Normal   150-199 mg/dL, Borderline high   200-499 mg/dL, High  >499  mg/dL, Very high   HDL Cholesterol  >39 mg/dL 99 [facility]   Comment:  40-59 mg/dL, Acceptable  >59 mg/dL, High: Negative risk factor for coronary heart disease  <40 mg/dL, Low: Positive risk factor for coronary heart disease   Non HDL Cholesterol  <130 mg/dL 104 [facility]   Comment: <130 mg/dL, Optimal   130-159 mg/dL, Near optimal/above optimal   160-189 mg/dL, Borderline high   190-219 mg/dL, High  >219 mg/dL, Very high  Secondary prevention optimal non HDL Cholesterol levels are recommended to be <100 mg/dL   Fasting Time  hrs 12 [facility]   VLDL Cholesterol  <30 mg/dL 10 [facility]   TC:HDL Ratio  <5.10 2.05 [facility]   LDL Cholesterol  <100 mg/dL 94 [facility]   Comment: <100 mg/dL, Optimal   100-129 mg/dL, Near optimal/above optimal   130-159 mg/dL, Borderline high   160-189 mg/dL, High  >189 mg/dL, Very high  Secondary prevention optimal LDL Cholesterol levels are recommended to be < 70 mg/dL   LDL:HDL Ratio  <2.54 0.95 [facility]          Specimen Collected: 09/02/23 10:10         Objective     Mental Status Examination  General Appearance: Well groomed, appropriate eye contact.  Gait/Station: n/a  Speech: Normal rate, volume, prosody  Mood: fine  Affect: pleasant, full range   Thought Process: Linear, goal directed  Thought Associations:- intact   Thought Content: normal  Perception: No perceptual abnormalities noted  Level of Consciousness: Alert  Orientation: Alert and oriented to person, place, time and situation  Attention and Concentration: Intact  Recent Memory: fair   Remote Memory: caregiver helps with historical details   Executive function: needs assistance with IADLs   Language: no clear deficits, at baseline   Fund of knowledge: fair   Insight :fair   Judgment: fair         Assessment/Plan   69 y/o single female who presented 7/19/2021 with a long h/o  excessive worry and there are associated somatic symptoms , OCD symptoms, depression, fatigue. She is disabled due to functional impairment - not able to be fully independent (? cognitive disorder, autistic spectrum ? ). Chronic complaints include anxiety/depression, fatigue, poor motivation. Addition of remeron had been partially helpful with mood and anxiety and recent addition of risperdal has helped with mood/negative rumination. No treatment changes at this time. No med SE, no involuntary abnormal movements. There is no psychosis, death wish, SI, HI. She lives in [facility] and her friend Raul is very helpful.     She in on tegretol for seizures, recently saw neurology.   She is a poor historian and her friend clarifies historical details.       dx: PDD, mild-mod; OCD; AIYANA; r/o cog disorder     1) continue remeron 30 mg at night , citalopram 20 mg ,  risperdal 0.75 mg at night, reviewed r/b/a including metabolic monitoring and TD risk , patient gives IC   2) consider neuropsych testing to characterize cognitive difficulties   3) available labs reviewed  4) f/u 8 weeks   Patient Active Problem List   Diagnosis    Physical deconditioning    Ambulatory dysfunction    Abnormal CBC    Anemia    Anxiety disorder    Asperger's disorder    Chronic back pain    Closed nondisplaced oblique fracture of shaft of ulna with routine healing    Major depression    Dysphagia    Abdominal pain, periumbilical    Epigastric abdominal pain    Fatigue    Grand mal seizure (CMS/HCC)    Headache    Heart murmur    Hemangioma of skin and subcutaneous tissue    Hematuria    Hot flashes, menopausal    Humerus fracture    Hyperlipidemia    Increased frequency of urination    Chronic constipation    Irritable bowel syndrome with constipation    Irritable bowel syndrome with diarrhea    Malnutrition (CMS/HCC)    Nevus, non-neoplastic    Osteoporosis    Other melanin hyperpigmentation    Other seborrheic keratosis    Pancreatic cyst    Restless  leg syndrome    Shoulder pain, left    Closed traumatic displaced fracture of distal end of radius with routine healing, subsequent encounter    Tubular adenoma of colon    Abdominal mass, LUQ (left upper quadrant)    Uterine mass    Vaginal atrophy    Vitamin D deficiency    Burst fracture of T12 vertebra (CMS/HCC)    Distal radius fracture, left    Left wrist pain    Odynophagia    Other intraarticular fracture of lower end of left radius, subsequent encounter for closed fracture with routine healing    Periprosthetic hip fracture, subsequent encounter    AIYANA (generalized anxiety disorder)    OCD (obsessive compulsive disorder)       Follow-up plan for depression was discussed with patient    Review with patient: Treatment plan reviewed with the patient.  Medication risks/benefit reviewed with the patient  Psychiatric Risk Assessment  Violence Risk Assessment: none  Acute Risk of Harm to Others is Considered: low   Suicide Risk Assessment: age > 65 yrs old and   Protective Factors against Suicide: adherence to  treatment, moral objections to suicide, and social support/connectedness  Acute Risk of Harm to Self is Considered: low    Rukhsana Kim, DO

## 2024-01-23 ENCOUNTER — TELEMEDICINE (OUTPATIENT)
Dept: BEHAVIORAL HEALTH | Facility: CLINIC | Age: 69
End: 2024-01-23
Payer: MEDICARE

## 2024-01-23 DIAGNOSIS — F41.1 GAD (GENERALIZED ANXIETY DISORDER): ICD-10-CM

## 2024-01-23 DIAGNOSIS — F33.9 RECURRENT MAJOR DEPRESSIVE DISORDER, REMISSION STATUS UNSPECIFIED (CMS-HCC): ICD-10-CM

## 2024-01-23 PROCEDURE — 99214 OFFICE O/P EST MOD 30 MIN: CPT | Performed by: PSYCHIATRY & NEUROLOGY

## 2024-01-23 NOTE — PATIENT INSTRUCTIONS
-continue to take your medications daily   -I will see you in 4-8 weeks    -If there are any thoughts of harming your self, harming others, concerning worsening of your mental health symptoms or concerning medication side effects, please call [phone] or reports to the nearest emergency room.

## 2024-01-23 NOTE — PROGRESS NOTES
"Outpatient Psychiatry          Reason for Visit: follow up for PDD, mild-mod; OCD; AIYANA; r/o cog disorder . \"I am pretty much the same\"  -Raul Sarmiento resent for visit. She notes that Marshall has more insight into unreasonable worry and wants to work on controlling worry. Notes Marshall is more talkative, interactive.      HPI: 67 y/o single female who has been treated for depression and anxiety during her adult life.. She is tolerating remeron and Risperdal and no SE. Mood continues to be better. Denies persistent sadness.     Continues to be  more active, going on walks, more social and engaging more in activities.  Does word trivia and word games. Continues in balance class .    There is still chronic worry which she feels impacts her motivation to do things.    Denies new health issues. No falls   Sleeps well , appetite is ok. she is holding at 82 pounds     There is no death wish or SI, hallucinations, paranoia, alfonso, wants things to be perfect and neat , counts to 50, has to check things many times, takes pills in and out of pill box.    she does not want to go to a nursing home.  2022 at ER as she she had inadvertently OD on tegretol, 2022 she fell and bumper her head which resulted in an ER visit.    PAST PSYCH H/O: last saw a psychiatrist over  saw an NP in May 2021 X 1 and was prescribed abilify which she did not take   PAST PSYCH HOSP: none   PAST SELF HARM: none   past psych meds: abilify, celexa (since ) , trazadone , gabapentin , elavil , buspar , risperdal     DANI: Denies all substance abuse   FAMILY PSYCH H/O: brother - schizophrenia , alcohol abuse     reviewed past med h/o, meds, allergies    SOCIAL H/O: parents were optometrists, one living brother -, one  brother. 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 Never , no children. She has never been able to be fully independent - unclear " what the etiology is.        Current Medications:    Current Outpatient Medications:     acetaminophen (Tylenol) 325 mg tablet, Take 2 tablets (650 mg) by mouth every 4 hours if needed., Disp: , Rfl:     carBAMazepine (CarbatroL) 300 mg 12 hr capsule, Take 1 capsule (300 mg) by mouth 3 times a day., Disp: , Rfl:     citalopram (CeleXA) 20 mg tablet, Take 1 tablet (20 mg) by mouth once daily., Disp: 30 tablet, Rfl: 2    mirtazapine (Remeron) 45 mg tablet, Take 0.5 tablets (22.5 mg) by mouth once daily at bedtime., Disp: , Rfl:     multivitamin tablet, Take 1 tablet by mouth once daily., Disp: , Rfl:     omega-3 fatty acids-fish oil (Fish OiL) 340-1,000 mg capsule, Take 1 capsule by mouth once daily., Disp: , Rfl:     oxybutynin XL (Ditropan-XL) 10 mg 24 hr tablet, Take 1 tablet (10 mg) by mouth once daily., Disp: , Rfl:     risperiDONE (RisperDAL) 0.25 mg tablet, Take 3 tablets (0.75 mg) by mouth once daily at bedtime., Disp: 90 tablet, Rfl: 2    simvastatin (Zocor) 20 mg tablet, Take 1 tablet (20 mg) by mouth once daily., Disp: , Rfl:   Medical History:  Past Medical History:   Diagnosis Date    Asymptomatic menopausal state     Menopause    Epilepsy, unspecified, not intractable, without status epilepticus (CMS/[facility]) 11/07/2022    Epilepsy    Fracture of right shoulder girdle, part unspecified, initial encounter for closed fracture     Fracture of right shoulder     Surgical History:  Past Surgical History:   Procedure Laterality Date    KNEE ARTHROSCOPY W/ DEBRIDEMENT  02/13/2014    Arthroscopy Knee    MR HEAD ANGIO WO IV CONTRAST  12/15/2022    MR HEAD ANGIO WO IV CONTRAST 12/15/2022 [facility]    MR NECK ANGIO WO IV CONTRAST  12/15/2022    MR NECK ANGIO WO IV CONTRAST 12/15/2022 [facility]    TONSILLECTOMY  02/13/2014    Tonsillectomy With Adenoidectomy     Family History:  Family History   Problem Relation Name Age of Onset    Hyperlipidemia Mother      Hypertension Mother      Irritable  bowel syndrome Mother       Social History:  Social History     Socioeconomic History    Marital status: Single     Spouse name: Not on file    Number of children: Not on file    Years of education: Not on file    Highest education level: Not on file   Occupational History    Not on file   Tobacco Use    Smoking status: Never    Smokeless tobacco: Never   Substance and Sexual Activity    Alcohol use: Not on file    Drug use: Not on file    Sexual activity: Not on file   Other Topics Concern    Not on file   Social History Narrative    Not on file     Social Determinants of Health     Financial Resource Strain: Not on file   Food Insecurity: Not on file   Transportation Needs: Not on file   Physical Activity: Not on file   Stress: Not on file   Social Connections: Not on file   Intimate Partner Violence: Not on file   Housing Stability: Not on file       Medical Review Of Systems:  Pertinent items are noted in HPI.    Psychiatric Review Of Systems:  Psychiatric ROS - Adult  Anxiety: General Anxiety Disorder (AIYANA)AIYANA Behaviors: difficult to control worry, excessive anxiety/worry, difficulty concentrating, and irritability  Depression: most of the time mood is ok, able to enjoy things   Delirium: negative  Psychosis: negative  Codi: negative  Safety Issues: none  Psychiatric ROS Comment: less persistent rumination/worry  Disordered Eating Symptoms:n/a   Inattentive Symptoms: n/a    Hyperactive/Impulsive Symptoms:n/a     Trauma Symptoms:n/a       There were no vitals taken for this visit.     LABS  Lab Results   Component Value Date    TSH 1.11 05/20/2022    [id number] 1,582 (H) 04/27/2022        Chemistry    Lab Results   Component Value Date/Time    NA CANCELED 04/20/2023 0204    K CANCELED 04/20/2023 0204    CL CANCELED 04/20/2023 0204    CO2 CANCELED 04/20/2023 0204    BUN CANCELED 04/20/2023 0204    CREATININE CANCELED 04/20/2023 0204    Lab Results   Component Value Date/Time    CALCIUM CANCELED 04/20/2023 0204     ALKPHOS 56 04/15/2023 1508    AST 11 04/15/2023 1508    ALT 13 04/15/2023 1508    BILITOT 0.3 04/15/2023 1508        ains abnormal data LIPID PANEL BASIC  Order: [id number]  Component  Ref Range & Units 1 mo ago Resulting Agency   Cholesterol, Total  <200 mg/dL 203 High  [facility]   Comment: <200 mg/dL, Desirable   200-239 mg/dL, Borderline high  >239 mg/dL, High   Triglyceride  <150 mg/dL 50 [facility]   Comment: <150 mg/dL, Normal   150-199 mg/dL, Borderline high   200-499 mg/dL, High  >499 mg/dL, Very high   HDL Cholesterol  >39 mg/dL 99 [facility]   Comment:  40-59 mg/dL, Acceptable  >59 mg/dL, High: Negative risk factor for coronary heart disease  <40 mg/dL, Low: Positive risk factor for coronary heart disease   Non HDL Cholesterol  <130 mg/dL 104 [facility]   Comment: <130 mg/dL, Optimal   130-159 mg/dL, Near optimal/above optimal   160-189 mg/dL, Borderline high   190-219 mg/dL, High  >219 mg/dL, Very high  Secondary prevention optimal non HDL Cholesterol levels are recommended to be <100 mg/dL   Fasting Time  hrs 12 [facility]   VLDL Cholesterol  <30 mg/dL 10 [facility]   TC:HDL Ratio  <5.10 2.05 [facility]   LDL Cholesterol  <100 mg/dL 94 [facility]   Comment: <100 mg/dL, Optimal   100-129 mg/dL, Near optimal/above optimal   130-159 mg/dL, Borderline high   160-189 mg/dL, High  >189 mg/dL, Very high  Secondary prevention optimal LDL Cholesterol levels are recommended to be < 70 mg/dL   LDL:HDL Ratio  <2.54 0.95 [facility]          Specimen Collected: 09/02/23 10:10         Objective     Mental Status Examination  General Appearance: Well groomed, appropriate eye contact.  Gait/Station: n/a  Speech: Normal rate, volume, prosody  Mood: fine  Affect: pleasant, full range   Thought Process: Linear, goal directed  Thought  Associations:- intact   Thought Content: normal  Perception: No perceptual abnormalities noted  Level of Consciousness: Alert  Orientation: Alert and oriented to person, place, time and situation  Attention and Concentration: Intact  Recent Memory: fair   Remote Memory: caregiver helps with historical details   Executive function: needs assistance with IADLs   Language: no clear deficits, at baseline   Fund of knowledge: fair   Insight :fair   Judgment: fair         Assessment/Plan   67 y/o single female who presented 7/19/2021 with a long h/o excessive worry and there are associated somatic symptoms , OCD symptoms, depression, fatigue. She is disabled due to functional impairment - not able to be fully independent (? cognitive disorder, autistic spectrum ? ). Chronic complaints include anxiety/depression, fatigue, poor motivation. Addition of remeron had been partially helpful with mood and anxiety and recent addition of risperdal has helped with mood/negative rumination. No treatment changes at this time. No med SE, no involuntary abnormal movements. There is no psychosis, death wish, SI, HI. She lives in [facility] and her friend Raul is very helpful.     She in on tegretol for seizures, recently saw neurology.   She is a poor historian and her friend clarifies historical details.       dx: PDD, mild-mod; OCD; AIYANA; r/o cog disorder     1) continue remeron 30 mg at night , citalopram 20 mg ,  risperdal 0.75 mg at night, reviewed r/b/a including metabolic monitoring and TD risk , patient gives IC   2) consider neuropsych testing to characterize cognitive difficulties   3) available labs reviewed, lipids and blood sugar completed 9/2023   4) f/u 8 weeks   Patient Active Problem List   Diagnosis    Physical deconditioning    Ambulatory dysfunction    Abnormal CBC    Anemia    Anxiety disorder    Asperger's disorder    Chronic back pain    Closed nondisplaced oblique fracture of shaft of ulna with routine healing    Major  depression    Dysphagia    Abdominal pain, periumbilical    Epigastric abdominal pain    Fatigue    Grand mal seizure (CMS/HCC)    Headache    Heart murmur    Hemangioma of skin and subcutaneous tissue    Hematuria    Hot flashes, menopausal    Humerus fracture    Hyperlipidemia    Increased frequency of urination    Chronic constipation    Irritable bowel syndrome with constipation    Irritable bowel syndrome with diarrhea    Malnutrition (CMS/HCC)    Nevus, non-neoplastic    Osteoporosis    Other melanin hyperpigmentation    Other seborrheic keratosis    Pancreatic cyst    Restless leg syndrome    Shoulder pain, left    Closed traumatic displaced fracture of distal end of radius with routine healing, subsequent encounter    Tubular adenoma of colon    Abdominal mass, LUQ (left upper quadrant)    Uterine mass    Vaginal atrophy    Vitamin D deficiency    Burst fracture of T12 vertebra (CMS/HCC)    Distal radius fracture, left    Left wrist pain    Odynophagia    Other intraarticular fracture of lower end of left radius, subsequent encounter for closed fracture with routine healing    Periprosthetic hip fracture, subsequent encounter    AIYANA (generalized anxiety disorder)    OCD (obsessive compulsive disorder)       Follow-up plan for depression was discussed with patient    Review with patient: Treatment plan reviewed with the patient.  Medication risks/benefit reviewed with the patient  Psychiatric Risk Assessment  Violence Risk Assessment: none  Acute Risk of Harm to Others is Considered: low   Suicide Risk Assessment: age > 65 yrs old and   Protective Factors against Suicide: adherence to  treatment, moral objections to suicide, and social support/connectedness  Acute Risk of Harm to Self is Considered: low    Rukhsana Kim DO

## 2024-03-11 ENCOUNTER — TELEMEDICINE (OUTPATIENT)
Dept: BEHAVIORAL HEALTH | Facility: CLINIC | Age: 69
End: 2024-03-11
Payer: MEDICARE

## 2024-03-11 DIAGNOSIS — F41.1 GAD (GENERALIZED ANXIETY DISORDER): ICD-10-CM

## 2024-03-11 DIAGNOSIS — F42.9 OBSESSIVE-COMPULSIVE DISORDER, UNSPECIFIED TYPE: ICD-10-CM

## 2024-03-11 DIAGNOSIS — F33.40 RECURRENT MAJOR DEPRESSIVE DISORDER, IN REMISSION (CMS-HCC): ICD-10-CM

## 2024-03-11 DIAGNOSIS — F33.9 RECURRENT MAJOR DEPRESSIVE DISORDER, REMISSION STATUS UNSPECIFIED (CMS-HCC): ICD-10-CM

## 2024-03-11 PROCEDURE — 99214 OFFICE O/P EST MOD 30 MIN: CPT | Performed by: PSYCHIATRY & NEUROLOGY

## 2024-03-11 PROCEDURE — 1125F AMNT PAIN NOTED PAIN PRSNT: CPT | Performed by: PSYCHIATRY & NEUROLOGY

## 2024-03-11 PROCEDURE — 1036F TOBACCO NON-USER: CPT | Performed by: PSYCHIATRY & NEUROLOGY

## 2024-03-11 RX ORDER — CITALOPRAM 20 MG/1
20 TABLET, FILM COATED ORAL DAILY
Qty: 30 TABLET | Refills: 2 | Status: SHIPPED | OUTPATIENT
Start: 2024-03-11 | End: 2024-06-09

## 2024-03-11 RX ORDER — MIRTAZAPINE 30 MG/1
30 TABLET, FILM COATED ORAL NIGHTLY
Qty: 30 TABLET | Refills: 2 | Status: SHIPPED | OUTPATIENT
Start: 2024-03-11 | End: 2024-06-09

## 2024-03-11 RX ORDER — RISPERIDONE 0.25 MG/1
0.75 TABLET ORAL NIGHTLY
Qty: 90 TABLET | Refills: 2 | Status: SHIPPED | OUTPATIENT
Start: 2024-03-11 | End: 2024-06-04

## 2024-03-11 NOTE — PROGRESS NOTES
"Outpatient Psychiatry          Reason for Visit: follow up for PDD, mild-mod; OCD; AIYANA; r/o cog disorder .  \"I am ok\"  -Raul Sarmiento present for visit.      HPI: 67 y/o single female who has been treated for depression and anxiety during her adult life.. She is tolerating remeron and Risperdal and no SE. Mood continues to be better, more cooperative. Denies persistent sadness. There is still some degree of preoccupation with physical concerns. No longer calls Raul and her brother repeatedly.     Continues to be  more active, going on walks, more social and engaging more in activities.  Does word trivia and word games. Continues in balance class .    There is still chronic worry which she feels impacts her motivation to do things.    Denies new health issues. No falls   Sleeps well , appetite is ok. she is holding at 82 pounds     There is no death wish or SI, hallucinations, paranoia, alfonso, wants things to be perfect and neat , counts to 50, has to check things many times, takes pills in and out of pill box.    she does not want to go to a nursing home.  2022 at ER as she she had inadvertently OD on tegretol, 2022 she fell and bumper her head which resulted in an ER visit.    PAST PSYCH H/O: last saw a psychiatrist over  saw an NP in May 2021 X 1 and was prescribed abilify which she did not take   PAST PSYCH HOSP: none   PAST SELF HARM: none   past psych meds: abilify, celexa (since ) , trazadone , gabapentin , elavil , buspar , risperdal     DANI: Denies all substance abuse   FAMILY PSYCH H/O: brother - schizophrenia , alcohol abuse     reviewed past med h/o, meds, allergies    SOCIAL H/O: parents were optometrists, one living brother -, one  brother. 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 Never , no children. She has never been able to be fully independent - unclear what the etiology is.    "     Current Medications:    Current Outpatient Medications:     acetaminophen (Tylenol) 325 mg tablet, Take 2 tablets (650 mg) by mouth every 4 hours if needed., Disp: , Rfl:     carBAMazepine (CarbatroL) 300 mg 12 hr capsule, Take 1 capsule (300 mg) by mouth 3 times a day., Disp: , Rfl:     citalopram (CeleXA) 20 mg tablet, Take 1 tablet (20 mg) by mouth once daily., Disp: 30 tablet, Rfl: 2    mirtazapine (Remeron) 45 mg tablet, Take 0.5 tablets (22.5 mg) by mouth once daily at bedtime., Disp: , Rfl:     multivitamin tablet, Take 1 tablet by mouth once daily., Disp: , Rfl:     omega-3 fatty acids-fish oil (Fish OiL) 340-1,000 mg capsule, Take 1 capsule by mouth once daily., Disp: , Rfl:     oxybutynin XL (Ditropan-XL) 10 mg 24 hr tablet, Take 1 tablet (10 mg) by mouth once daily., Disp: , Rfl:     risperiDONE (RisperDAL) 0.25 mg tablet, Take 3 tablets (0.75 mg) by mouth once daily at bedtime., Disp: 90 tablet, Rfl: 2    simvastatin (Zocor) 20 mg tablet, Take 1 tablet (20 mg) by mouth once daily., Disp: , Rfl:   Medical History:  Past Medical History:   Diagnosis Date    Asymptomatic menopausal state     Menopause    Epilepsy, unspecified, not intractable, without status epilepticus (CMS/[facility]) 11/07/2022    Epilepsy    Fracture of right shoulder girdle, part unspecified, initial encounter for closed fracture     Fracture of right shoulder     Surgical History:  Past Surgical History:   Procedure Laterality Date    KNEE ARTHROSCOPY W/ DEBRIDEMENT  02/13/2014    Arthroscopy Knee    MR HEAD ANGIO WO IV CONTRAST  12/15/2022    MR HEAD ANGIO WO IV CONTRAST 12/15/2022 [facility]    MR NECK ANGIO WO IV CONTRAST  12/15/2022    MR NECK ANGIO WO IV CONTRAST 12/15/2022 [facility]CTOR [facility]    TONSILLECTOMY  02/13/2014    Tonsillectomy With Adenoidectomy     Family History:  Family History   Problem Relation Name Age of Onset    Hyperlipidemia Mother      Hypertension Mother      Irritable bowel syndrome Mother        Social History:  Social History     Socioeconomic History    Marital status: Single     Spouse name: Not on file    Number of children: Not on file    Years of education: Not on file    Highest education level: Not on file   Occupational History    Not on file   Tobacco Use    Smoking status: Never    Smokeless tobacco: Never   Substance and Sexual Activity    Alcohol use: Not on file    Drug use: Not on file    Sexual activity: Not on file   Other Topics Concern    Not on file   Social History Narrative    Not on file     Social Determinants of Health     Financial Resource Strain: Not on file   Food Insecurity: Not on file   Transportation Needs: Not on file   Physical Activity: Not on file   Stress: Not on file   Social Connections: Not on file   Intimate Partner Violence: Not on file   Housing Stability: Not on file       Medical Review Of Systems:  Pertinent items are noted in HPI.    Psychiatric Review Of Systems:  Psychiatric ROS - Adult  Anxiety: General Anxiety Disorder (AIYANA)AIYANA Behaviors: difficult to control worry, excessive anxiety/worry, difficulty concentrating, and irritability  Depression: most of the time mood is ok, able to enjoy things   Delirium: negative  Psychosis: negative  Codi: negative  Safety Issues: none  Psychiatric ROS Comment: less persistent rumination/worry  Disordered Eating Symptoms:n/a   Inattentive Symptoms: n/a    Hyperactive/Impulsive Symptoms:n/a     Trauma Symptoms:n/a       There were no vitals taken for this visit.     LABS  Lab Results   Component Value Date    TSH 1.11 05/20/2022    [id number] 1,582 (H) 04/27/2022        Chemistry    Lab Results   Component Value Date/Time    NA CANCELED 04/20/2023 0204    K CANCELED 04/20/2023 0204    CL CANCELED 04/20/2023 0204    CO2 CANCELED 04/20/2023 0204    BUN CANCELED 04/20/2023 0204    CREATININE CANCELED 04/20/2023 0204    Lab Results   Component Value Date/Time    CALCIUM CANCELED 04/20/2023 0204    ALKPHOS 56 04/15/2023  1508    AST 11 04/15/2023 1508    ALT 13 04/15/2023 1508    BILITOT 0.3 04/15/2023 1508        ains abnormal data LIPID PANEL BASIC  Order: [id number]  Component  Ref Range & Units 1 mo ago Resulting Agency   Cholesterol, Total  <200 mg/dL 203 High  [facility]   Comment: <200 mg/dL, Desirable   200-239 mg/dL, Borderline high  >239 mg/dL, High   Triglyceride  <150 mg/dL 50 [facility]   Comment: <150 mg/dL, Normal   150-199 mg/dL, Borderline high   200-499 mg/dL, High  >499 mg/dL, Very high   HDL Cholesterol  >39 mg/dL 99 [facility]   Comment:  40-59 mg/dL, Acceptable  >59 mg/dL, High: Negative risk factor for coronary heart disease  <40 mg/dL, Low: Positive risk factor for coronary heart disease   Non HDL Cholesterol  <130 mg/dL 104 [facility]   Comment: <130 mg/dL, Optimal   130-159 mg/dL, Near optimal/above optimal   160-189 mg/dL, Borderline high   190-219 mg/dL, High  >219 mg/dL, Very high  Secondary prevention optimal non HDL Cholesterol levels are recommended to be <100 mg/dL   Fasting Time  hrs 12 [facility]   VLDL Cholesterol  <30 mg/dL 10 [facility]   TC:HDL Ratio  <5.10 2.05 [facility]   LDL Cholesterol  <100 mg/dL 94 [facility]   Comment: <100 mg/dL, Optimal   100-129 mg/dL, Near optimal/above optimal   130-159 mg/dL, Borderline high   160-189 mg/dL, High  >189 mg/dL, Very high  Secondary prevention optimal LDL Cholesterol levels are recommended to be < 70 mg/dL   LDL:HDL Ratio  <2.54 0.95 [facility]          Specimen Collected: 09/02/23 10:10         5/20/2022     1:41 PM 10/24/2022     9:06 AM 11/7/2022    11:45 AM 12/14/2022     7:31 PM 12/15/2022     6:54 PM 1/17/2023    10:42 AM 3/23/2023     3:40 PM   Vitals   Systolic 155 102 125 107  121    Diastolic 82 62 66 50  60    Heart Rate 76  68 58  69 69   Temp 36.5 °C (97.7  "°F)   36.3 °C (97.3 °F)      Resp 16  16 16  16    Height (in)  1.626 m (5' 4\") 1.626 m (5' 4\") 1.549 m (5' 0.98\") 1.548 m (5' 0.95\") 1.626 m (5' 4\") 1.626 m (5' 4\")   Weight (lb)  85.5 84 108.03 108.03 85 82.4   BMI  14.68 kg/m2 14.42 kg/m2 20.42 kg/m2 20.45 kg/m2 14.59 kg/m2 14.14 kg/m2   BSA (m2)  1.32 m2 1.31 m2 1.45 m2 1.45 m2 1.32 m2 1.3 m2         Objective     Mental Status Examination  General Appearance: Well groomed, appropriate eye contact.  Gait/Station: n/a  Speech: Normal rate, volume, prosody  Mood: fine  Affect: pleasant, full range   Thought Process: Linear, goal directed  Thought Associations:- intact   Thought Content: normal  Perception: No perceptual abnormalities noted  Level of Consciousness: Alert  Orientation: Alert and oriented to person, place, time and situation  Attention and Concentration: Intact  Recent Memory: fair   Remote Memory: caregiver helps with historical details   Executive function: needs assistance with IADLs   Language: no clear deficits, at baseline   Fund of knowledge: fair   Insight :fair   Judgment: fair         Assessment/Plan   67 y/o single female who presented 7/19/2021 with a long h/o excessive worry and there are associated somatic symptoms , OCD symptoms, depression, fatigue. She is disabled due to functional impairment - not able to be fully independent (? cognitive disorder, autistic spectrum ? ). Chronic complaints include anxiety/depression, fatigue, poor motivation. Addition of remeron had been partially helpful with mood and anxiety and recent addition of risperdal has helped with mood/negative rumination. No treatment changes at this time. No med SE, no involuntary abnormal movements. There is no psychosis, death wish, SI, HI. She lives in [facility] and her friend Raul is very helpful.     She in on tegretol for seizures, recently saw neurology.   She is a poor historian and her friend clarifies historical details.       dx: PDD, mild-mod; OCD; AIYANA; r/o cog " disorder     1) continue remeron 30 mg at night , citalopram 20 mg ,  risperdal 0.75 mg at night, reviewed r/b/a including metabolic monitoring and TD risk , patient gives IC   2) consider neuropsych testing to characterize cognitive difficulties   3) available labs reviewed, lipids and blood sugar completed 9/2023   4) f/u 8 weeks   Patient Active Problem List   Diagnosis    Physical deconditioning    Ambulatory dysfunction    Abnormal CBC    Anemia    Anxiety disorder    Asperger's disorder    Chronic back pain    Closed nondisplaced oblique fracture of shaft of ulna with routine healing    Major depression    Dysphagia    Abdominal pain, periumbilical    Epigastric abdominal pain    Fatigue    Grand mal seizure (CMS/HCC)    Headache    Heart murmur    Hemangioma of skin and subcutaneous tissue    Hematuria    Hot flashes, menopausal    Humerus fracture    Hyperlipidemia    Increased frequency of urination    Chronic constipation    Irritable bowel syndrome with constipation    Irritable bowel syndrome with diarrhea    Malnutrition (CMS/HCC)    Nevus, non-neoplastic    Osteoporosis    Other melanin hyperpigmentation    Other seborrheic keratosis    Pancreatic cyst    Restless leg syndrome    Shoulder pain, left    Closed traumatic displaced fracture of distal end of radius with routine healing, subsequent encounter    Tubular adenoma of colon    Abdominal mass, LUQ (left upper quadrant)    Uterine mass    Vaginal atrophy    Vitamin D deficiency    Burst fracture of T12 vertebra (CMS/HCC)    Distal radius fracture, left    Left wrist pain    Odynophagia    Other intraarticular fracture of lower end of left radius, subsequent encounter for closed fracture with routine healing    Periprosthetic hip fracture, subsequent encounter    AIYANA (generalized anxiety disorder)    OCD (obsessive compulsive disorder)       Follow-up plan for depression was discussed with patient    Review with patient: Treatment plan reviewed  with the patient.  Medication risks/benefit reviewed with the patient  Psychiatric Risk Assessment  Violence Risk Assessment: none  Acute Risk of Harm to Others is Considered: low   Suicide Risk Assessment: age > 65 yrs old and   Protective Factors against Suicide: adherence to  treatment, moral objections to suicide, and social support/connectedness  Acute Risk of Harm to Self is Considered: low    Rukhsana Kim, DO

## 2024-03-11 NOTE — PATIENT INSTRUCTIONS
1) Continue to take medications daily  2) I will see you in about 8 weeks    If there are any thoughts of harming your self, harming others, concerning worsening of your mental health symptoms or concerning medication side effects, please call [phone] or reports to the nearest emergency room.

## 2024-05-06 ENCOUNTER — TELEMEDICINE (OUTPATIENT)
Dept: BEHAVIORAL HEALTH | Facility: CLINIC | Age: 69
End: 2024-05-06
Payer: MEDICARE

## 2024-05-06 DIAGNOSIS — F33.9 RECURRENT MAJOR DEPRESSIVE DISORDER, REMISSION STATUS UNSPECIFIED (CMS-HCC): ICD-10-CM

## 2024-05-06 DIAGNOSIS — F41.1 GENERALIZED ANXIETY DISORDER: ICD-10-CM

## 2024-05-06 DIAGNOSIS — F41.1 GAD (GENERALIZED ANXIETY DISORDER): ICD-10-CM

## 2024-05-06 DIAGNOSIS — F42.9 OBSESSIVE-COMPULSIVE DISORDER, UNSPECIFIED TYPE: ICD-10-CM

## 2024-05-06 PROCEDURE — 90833 PSYTX W PT W E/M 30 MIN: CPT | Performed by: PSYCHIATRY & NEUROLOGY

## 2024-05-06 PROCEDURE — 1036F TOBACCO NON-USER: CPT | Performed by: PSYCHIATRY & NEUROLOGY

## 2024-05-06 PROCEDURE — 1159F MED LIST DOCD IN RCRD: CPT | Performed by: PSYCHIATRY & NEUROLOGY

## 2024-05-06 PROCEDURE — 99214 OFFICE O/P EST MOD 30 MIN: CPT | Performed by: PSYCHIATRY & NEUROLOGY

## 2024-05-06 NOTE — PATIENT INSTRUCTIONS
If there are any thoughts of harming your self, harming others, concerning worsening of your mental health symptoms or concerning medication side effects, please call [phone] or reports to the nearest emergency room.     Contact via Anomo or call sooner ([phone]) in case of any questions or concerns.

## 2024-05-06 NOTE — PROGRESS NOTES
"Outpatient Psychiatry          Reason for Visit: follow up for PDD, mild-mod; OCD; AIYANA; r/o cog disorder .  Mood is better, but she still gets \"worked up\" when she has to leave her apartment to do things   -Raul Sarmiento present for visit. She reports Marshall is making progress in attending more activities, a little more social , reading more, coloring.      HPI: 67 y/o single female who has been treated for depression and anxiety during her adult life.. She is tolerating remeron and Risperdal and no SE. Marshall is happy about the nice weather, going out a little more, trying to go to a couple programs per week. Sadness continues to be much decreased. \"I always worry\".  Still feels like she can not go outside by herself.   . There is still some degree of preoccupation with physical concerns. No longer calls Raul and her brother repeatedly.     Continues to be  more active, going on walks, more social and engaging more in activities.  Does word trivia and word games. Continues in balance class .     Denies new health issues. No falls   Sleeps well , appetite is ok. she is holding at 82 pounds  She would like to gain some weight.     There is no death wish or SI, hallucinations, paranoia, alfonso, wants things to be perfect and neat , counts to 50, has to check things many times, takes pills in and out of pill box.    she does not want to go to a nursing home.  5/2022 at ER as she she had inadvertently OD on tegretol, 4/24/2022 she fell and bumper her head which resulted in an ER visit.    PAST PSYCH H/O: last saw a psychiatrist over 2010 saw an NP in May 2021 X 1 and was prescribed abilify which she did not take   PAST PSYCH HOSP: none   PAST SELF HARM: none   past psych meds: abilify, celexa (since 2013) , trazadone , gabapentin , elavil , buspar , risperdal     DANI: Denies all substance abuse   FAMILY PSYCH H/O: brother - schizophrenia , alcohol abuse     reviewed past med h/o, meds, allergies    SOCIAL H/O: parents were " optometrists, one living brother -, one  brother. 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 Never , no children. She has never been able to be fully independent - unclear what the etiology is.        Current Medications:    Current Outpatient Medications:     acetaminophen (Tylenol) 325 mg tablet, Take 2 tablets (650 mg) by mouth every 4 hours if needed., Disp: , Rfl:     carBAMazepine (CarbatroL) 300 mg 12 hr capsule, Take 1 capsule (300 mg) by mouth 3 times a day., Disp: , Rfl:     citalopram (CeleXA) 20 mg tablet, Take 1 tablet (20 mg) by mouth once daily., Disp: 30 tablet, Rfl: 2    mirtazapine (Remeron) 30 mg tablet, Take 1 tablet (30 mg) by mouth once daily at bedtime., Disp: 30 tablet, Rfl: 2    multivitamin tablet, Take 1 tablet by mouth once daily., Disp: , Rfl:     omega-3 fatty acids-fish oil (Fish OiL) 340-1,000 mg capsule, Take 1 capsule by mouth once daily., Disp: , Rfl:     oxybutynin XL (Ditropan-XL) 10 mg 24 hr tablet, Take 1 tablet (10 mg) by mouth once daily., Disp: , Rfl:     risperiDONE (RisperDAL) 0.25 mg tablet, Take 3 tablets (0.75 mg) by mouth once daily at bedtime., Disp: 90 tablet, Rfl: 2    simvastatin (Zocor) 20 mg tablet, Take 1 tablet (20 mg) by mouth once daily., Disp: , Rfl:   Medical History:  Past Medical History:   Diagnosis Date    Asymptomatic menopausal state     Menopause    Epilepsy, unspecified, not intractable, without status epilepticus (Multi) 2022    Epilepsy    Fracture of right shoulder girdle, part unspecified, initial encounter for closed fracture     Fracture of right shoulder     Surgical History:  Past Surgical History:   Procedure Laterality Date    KNEE ARTHROSCOPY W/ DEBRIDEMENT  2014    Arthroscopy Knee    MR HEAD ANGIO WO IV CONTRAST  12/15/2022    MR HEAD ANGIO WO IV CONTRAST 12/15/2022 [facility]    MR NECK ANGIO WO IV CONTRAST   12/15/2022    MR NECK ANGIO WO IV CONTRAST 12/15/2022 [facility]CTOR OFF[facility]    TONSILLECTOMY  02/13/2014    Tonsillectomy With Adenoidectomy     Family History:  Family History   Problem Relation Name Age of Onset    Hyperlipidemia Mother      Hypertension Mother      Irritable bowel syndrome Mother       Social History:  Social History     Socioeconomic History    Marital status: Single     Spouse name: Not on file    Number of children: Not on file    Years of education: Not on file    Highest education level: Not on file   Occupational History    Not on file   Tobacco Use    Smoking status: Never    Smokeless tobacco: Never   Substance and Sexual Activity    Alcohol use: Not on file    Drug use: Not on file    Sexual activity: Not on file   Other Topics Concern    Not on file   Social History Narrative    Not on file     Social Determinants of Health     Financial Resource Strain: Not on file   Food Insecurity: Not on file   Transportation Needs: Not on file   Physical Activity: Not on file   Stress: Not on file   Social Connections: Not on file   Intimate Partner Violence: Not on file   Housing Stability: Not on file       Medical Review Of Systems:  Pertinent items are noted in HPI.    Psychiatric Review Of Systems:  Psychiatric ROS - Adult  Anxiety: General Anxiety Disorder (AIYANA)AIYANA Behaviors: difficult to control worry, excessive anxiety/worry, difficulty concentrating, and irritability  Depression: most of the time mood is ok, able to enjoy things   Delirium: negative  Psychosis: negative  Codi: negative  Safety Issues: none  Psychiatric ROS Comment: less persistent rumination/worry  Disordered Eating Symptoms:n/a   Inattentive Symptoms: n/a    Hyperactive/Impulsive Symptoms:n/a     Trauma Symptoms:n/a       There were no vitals taken for this visit.     LABS  Lab Results   Component Value Date    TSH 1.11 05/20/2022    CKUTCBZN10 1,582 (H) 04/27/2022        Chemistry    Lab Results   Component Value  Date/Time    NA CANCELED 04/20/2023 0204    K CANCELED 04/20/2023 0204    CL CANCELED 04/20/2023 0204    CO2 CANCELED 04/20/2023 0204    BUN CANCELED 04/20/2023 0204    CREATININE CANCELED 04/20/2023 0204    Lab Results   Component Value Date/Time    CALCIUM CANCELED 04/20/2023 0204    ALKPHOS 56 04/15/2023 1508    AST 11 04/15/2023 1508    ALT 13 04/15/2023 1508    BILITOT 0.3 04/15/2023 1508        ains abnormal data LIPID PANEL BASIC  Order: [id number]  Component  Ref Range & Units 1 mo ago Resulting Agency   Cholesterol, Total  <200 mg/dL 203 [facility]  [facility]   Comment: <200 mg/dL, Desirable   200-239 mg/dL, Borderline high  >239 mg/dL, High   Triglyceride  <150 mg/dL 50 [facility]   Comment: <150 mg/dL, Normal   150-199 mg/dL, Borderline high   200-499 mg/dL, High  >499 mg/dL, Very high   HDL Cholesterol  >39 mg/dL 99 [facility]   Comment:  40-59 mg/dL, Acceptable  >59 mg/dL, High: Negative risk factor for coronary heart disease  <40 mg/dL, Low: Positive risk factor for coronary heart disease   Non HDL Cholesterol  <130 mg/dL 104 [facility]   Comment: <130 mg/dL, Optimal   130-159 mg/dL, Near optimal/above optimal   160-189 mg/dL, Borderline high   190-219 mg/dL, High  >219 mg/dL, Very high  Secondary prevention optimal non HDL Cholesterol levels are recommended to be <100 mg/dL   Fasting Time  hrs 12 [facility]   VLDL Cholesterol  <30 mg/dL 10 [facility]   TC:HDL Ratio  <5.10 2.05 [facility]   LDL Cholesterol  <100 mg/dL 94 [facility]   Comment: <100 mg/dL, Optimal   100-129 mg/dL, Near optimal/above optimal   130-159 mg/dL, Borderline high   160-189 mg/dL, High  >189 mg/dL, Very high  Secondary prevention optimal LDL Cholesterol levels are recommended to be < 70 mg/dL   LDL:HDL Ratio  <2.54 0.95 [facility]          Specimen  "Collected: 09/02/23 10:10         5/20/2022     1:41 PM 10/24/2022     9:06 AM 11/7/2022    11:45 AM 12/14/2022     7:31 PM 12/15/2022     6:54 PM 1/17/2023    10:42 AM 3/23/2023     3:40 PM   Vitals   Systolic 155 102 125 107  121    Diastolic 82 62 66 50  60    Heart Rate 76  68 58  69 69   Temp 36.5 °C (97.7 °F)   36.3 °C (97.3 °F)      Resp 16  16 16  16    Height (in)  1.626 m (5' 4\") 1.626 m (5' 4\") 1.549 m (5' 0.98\") 1.548 m (5' 0.95\") 1.626 m (5' 4\") 1.626 m (5' 4\")   Weight (lb)  85.5 84 108.03 108.03 85 82.4   BMI  14.68 kg/m2 14.42 kg/m2 20.42 kg/m2 20.45 kg/m2 14.59 kg/m2 14.14 kg/m2   BSA (m2)  1.32 m2 1.31 m2 1.45 m2 1.45 m2 1.32 m2 1.3 m2         Objective     Mental Status Examination  General Appearance: Well groomed, appropriate eye contact.  Gait/Station: n/a  Speech: Normal rate, volume, prosody  Mood: fine  Affect: pleasant, full range   Thought Process: Linear, goal directed  Thought Associations:- intact   Thought Content: normal  Perception: No perceptual abnormalities noted  Level of Consciousness: Alert  Orientation: Alert and oriented to person, place, time and situation  Attention and Concentration: Intact  Recent Memory: fair   Remote Memory: caregiver helps with historical details   Executive function: needs assistance with IADLs   Language: no clear deficits, at baseline   Fund of knowledge: fair   Insight :fair   Judgment: fair         Assessment/Plan   67 y/o single female who presented 7/19/2021 with a long h/o excessive worry and there are associated somatic symptoms , OCD symptoms, depression, fatigue. She is disabled due to functional impairment - not able to be fully independent (? cognitive disorder, autistic spectrum ? ). Chronic complaints include anxiety/depression, fatigue, poor motivation. Addition of remeron had been partially helpful with mood and anxiety and addition of risperdal has helped with mood/negative rumination. No treatment changes at this time. No med SE, " although she admits to dry mouth,  no involuntary abnormal movements. There is no psychosis, death wish, SI, HI. She lives in [facility] and her friend Raul is very helpful.     She in on tegretol for seizures, recently saw neurology.   She is a poor historian and her friend clarifies historical details.       dx: PDD, mild-mod; OCD; AIYANA; r/o cog disorder     1) continue remeron 30 mg at night , citalopram 20 mg ,  risperdal 0.75 mg at night, reviewed r/b/a including metabolic monitoring and TD risk , patient gives IC   2) consider neuropsych testing to characterize cognitive difficulties   3) available labs reviewed, lipids and blood sugar completed 9/2023   4) f/u 8 weeks     [id number]- 18 minutes- motivational interviewing - Marshall is trying to attend more activities. We continue to discuss her anticipatory anxiety which can prevent her from going out. Encourage graded exposure, taking small steps. It continues to be hard for Marshall to turn her mind off, thought stopping. Commended for doing more and pushing herself more than she had in the past.   Patient Active Problem List   Diagnosis    Physical deconditioning    Ambulatory dysfunction    Abnormal CBC    Anemia    Anxiety disorder    Asperger's disorder (HHS-HCC)    Chronic back pain    Closed nondisplaced oblique fracture of shaft of ulna with routine healing    Major depression    Dysphagia    Abdominal pain, periumbilical    Epigastric abdominal pain    Fatigue    Grand mal seizure (Multi)    Headache    Heart murmur    Hemangioma of skin and subcutaneous tissue    Hematuria    Hot flashes, menopausal    Humerus fracture    Hyperlipidemia    Increased frequency of urination    Chronic constipation    Irritable bowel syndrome with constipation    Irritable bowel syndrome with diarrhea    Malnutrition (Multi)    Nevus, non-neoplastic    Osteoporosis    Other melanin hyperpigmentation    Other seborrheic keratosis    Pancreatic cyst (HHS-HCC)    Restless leg syndrome     Shoulder pain, left    Closed traumatic displaced fracture of distal end of radius with routine healing, subsequent encounter    Tubular adenoma of colon    Abdominal mass, LUQ (left upper quadrant)    Uterine mass    Vaginal atrophy    Vitamin D deficiency    Burst fracture of T12 vertebra (Multi)    Distal radius fracture, left    Left wrist pain    Odynophagia    Other intraarticular fracture of lower end of left radius, subsequent encounter for closed fracture with routine healing    Periprosthetic hip fracture, subsequent encounter    AIYANA (generalized anxiety disorder)    OCD (obsessive compulsive disorder)       Follow-up plan for depression was discussed with patient    Review with patient: Treatment plan reviewed with the patient.  Medication risks/benefit reviewed with the patient  Psychiatric Risk Assessment  Violence Risk Assessment: none  Acute Risk of Harm to Others is Considered: low   Suicide Risk Assessment: age > 65 yrs old and   Protective Factors against Suicide: adherence to  treatment, moral objections to suicide, and social support/connectedness  Acute Risk of Harm to Self is Considered: low    Rukhsana Kim, DO

## 2024-05-13 DIAGNOSIS — G40.409 GRAND MAL SEIZURE (MULTI): Primary | ICD-10-CM

## 2024-05-13 RX ORDER — CARBAMAZEPINE 300 MG/1
300 CAPSULE, EXTENDED RELEASE ORAL 3 TIMES DAILY
Qty: 90 CAPSULE | Refills: 11 | OUTPATIENT
Start: 2024-05-13

## 2024-05-13 RX ORDER — CARBAMAZEPINE 300 MG/1
300 CAPSULE, EXTENDED RELEASE ORAL 3 TIMES DAILY
Qty: 270 CAPSULE | Refills: 1 | Status: SHIPPED | OUTPATIENT
Start: 2024-05-13 | End: 2024-11-09

## 2024-06-17 DIAGNOSIS — F41.1 GAD (GENERALIZED ANXIETY DISORDER): ICD-10-CM

## 2024-06-17 RX ORDER — MIRTAZAPINE 30 MG/1
30 TABLET, FILM COATED ORAL NIGHTLY
Qty: 30 TABLET | Refills: 0 | Status: SHIPPED | OUTPATIENT
Start: 2024-06-17

## 2024-06-25 ENCOUNTER — APPOINTMENT (OUTPATIENT)
Dept: BEHAVIORAL HEALTH | Facility: CLINIC | Age: 69
End: 2024-06-25
Payer: MEDICARE

## 2024-06-25 DIAGNOSIS — F33.9 RECURRENT MAJOR DEPRESSIVE DISORDER, REMISSION STATUS UNSPECIFIED (CMS-HCC): ICD-10-CM

## 2024-06-25 DIAGNOSIS — F41.1 GAD (GENERALIZED ANXIETY DISORDER): ICD-10-CM

## 2024-06-25 DIAGNOSIS — F42.9 OBSESSIVE-COMPULSIVE DISORDER, UNSPECIFIED TYPE: ICD-10-CM

## 2024-06-25 PROCEDURE — 99214 OFFICE O/P EST MOD 30 MIN: CPT | Performed by: PSYCHIATRY & NEUROLOGY

## 2024-06-25 PROCEDURE — 90833 PSYTX W PT W E/M 30 MIN: CPT | Performed by: PSYCHIATRY & NEUROLOGY

## 2024-06-25 NOTE — PROGRESS NOTES
"Outpatient Psychiatry          Reason for Visit: follow up for PDD, mild-mod; OCD; AIYANA; r/o cog disorder .  \"I'm doing OK\"  -Raul Sarmiento present for visit. She reports Marshall is making progress in attending more activities, a little more social , reading more, coloring.  Marshall has less catastrophic thinking.   -she has stumbled a few times which she reports to staff      HPI: 67 y/o single female who is being treated for depression . She reports mood is fine most of the time, she is working to control negative thinking and boost her mood by behavioral measures when not feeling as well.  She is tolerating remeron and Risperdal and no SE.   There is still chronic worry.   . There is still some degree of preoccupation with physical concerns. No longer calls Raul and her brother repeatedly.     Continues to be  more active, going on walks, more social and engaging more in activities.  Does word trivia and word games. Continues in balance class .     Denies new health issues. No falls   Sleeps well , appetite is ok. she is holding at 82 pounds  She would like to gain some weight.     There is no death wish or SI, hallucinations, paranoia, alfonso, wants things to be perfect and neat , counts to 50, has to check things many times, takes pills in and out of pill box.    she does not want to go to a nursing home.  5/2022 at ER as she she had inadvertently OD on tegretol, 4/24/2022 she fell and bumper her head which resulted in an ER visit.    PAST PSYCH H/O: last saw a psychiatrist over 2010 saw an NP in May 2021 X 1 and was prescribed abilify which she did not take   PAST PSYCH HOSP: none   PAST SELF HARM: none   past psych meds: abilify, celexa (since 2013) , trazadone , gabapentin , elavil , buspar , risperdal     DANI: Denies all substance abuse   FAMILY PSYCH H/O: brother - schizophrenia , alcohol abuse     reviewed past med h/o, meds, allergies    SOCIAL H/O: parents were optometrists, one living brother -, one "  brother. 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 Never , no children. She has never been able to be fully independent - unclear what the etiology is.        Current Medications:    Current Outpatient Medications:     acetaminophen (Tylenol) 325 mg tablet, Take 2 tablets (650 mg) by mouth every 4 hours if needed., Disp: , Rfl:     carBAMazepine (CarbatroL) 300 mg 12 hr capsule, Take 1 capsule (300 mg) by mouth 3 times a day. Needs appointment. Can see Brynn Lara NP ([phone]), Disp: 270 capsule, Rfl: 1    citalopram (CeleXA) 20 mg tablet, Take 1 tablet (20 mg) by mouth once daily., Disp: 30 tablet, Rfl: 2    mirtazapine (Remeron) 30 mg tablet, GIVE 1 TABLET BY MOUTH  DAILY AT BEDTIME, Disp: 30 tablet, Rfl: 0    multivitamin tablet, Take 1 tablet by mouth once daily., Disp: , Rfl:     omega-3 fatty acids-fish oil (Fish OiL) 340-1,000 mg capsule, Take 1 capsule by mouth once daily., Disp: , Rfl:     oxybutynin XL (Ditropan-XL) 10 mg 24 hr tablet, Take 1 tablet (10 mg) by mouth once daily., Disp: , Rfl:     risperiDONE (RisperDAL) 0.25 mg tablet, TAKE 3 TABLETS (0.75 MG) BY MOUTH ONCE DAILY AT BEDTIME., Disp: 90 tablet, Rfl: 0    simvastatin (Zocor) 20 mg tablet, Take 1 tablet (20 mg) by mouth once daily., Disp: , Rfl:   Medical History:  Past Medical History:   Diagnosis Date    Asymptomatic menopausal state     Menopause    Epilepsy, unspecified, not intractable, without status epilepticus (Multi) 2022    Epilepsy    Fracture of right shoulder girdle, part unspecified, initial encounter for closed fracture     Fracture of right shoulder     Surgical History:  Past Surgical History:   Procedure Laterality Date    KNEE ARTHROSCOPY W/ DEBRIDEMENT  2014    Arthroscopy Knee    MR HEAD ANGIO WO IV CONTRAST  12/15/2022    MR HEAD ANGIO WO IV CONTRAST 12/15/2022 [facility]    MR NECK ANGIO WO  IV CONTRAST  12/15/2022    MR NECK ANGIO WO IV CONTRAST 12/15/2022 DOCTOR OFFICE [facility]    TONSILLECTOMY  02/13/2014    Tonsillectomy With Adenoidectomy     Family History:  Family History   Problem Relation Name Age of Onset    Hyperlipidemia Mother      Hypertension Mother      Irritable bowel syndrome Mother       Social History:  Social History     Socioeconomic History    Marital status: Single     Spouse name: Not on file    Number of children: Not on file    Years of education: Not on file    Highest education level: Not on file   Occupational History    Not on file   Tobacco Use    Smoking status: Never    Smokeless tobacco: Never   Substance and Sexual Activity    Alcohol use: Not on file    Drug use: Not on file    Sexual activity: Not on file   Other Topics Concern    Not on file   Social History Narrative    Not on file     Social Determinants of Health     Financial Resource Strain: Not on file   Food Insecurity: Not on file   Transportation Needs: Not on file   Physical Activity: Not on file   Stress: Not on file   Social Connections: Not on file   Intimate Partner Violence: Not on file   Housing Stability: Not on file       Medical Review Of Systems:  Pertinent items are noted in HPI.    Psychiatric Review Of Systems:  Psychiatric ROS - Adult  Anxiety: General Anxiety Disorder (AIYANA)AIYANA Behaviors: difficult to control worry, excessive anxiety/worry, difficulty concentrating, and irritability  Depression: most of the time mood is ok, able to enjoy things   Delirium: negative  Psychosis: negative  Codi: negative  Safety Issues: none  Psychiatric ROS Comment: less persistent rumination/worry  Disordered Eating Symptoms:n/a   Inattentive Symptoms: n/a    Hyperactive/Impulsive Symptoms:n/a     Trauma Symptoms:n/a       There were no vitals taken for this visit.     LABS  Lab Results   Component Value Date    TSH 1.11 05/20/2022    [id number] 1,582 (H) 04/27/2022        Chemistry    Lab Results   Component  Value Date/Time    NA CANCELED 04/20/2023 0204    K CANCELED 04/20/2023 0204    CL CANCELED 04/20/2023 0204    CO2 CANCELED 04/20/2023 0204    BUN CANCELED 04/20/2023 0204    CREATININE CANCELED 04/20/2023 0204    Lab Results   Component Value Date/Time    CALCIUM CANCELED 04/20/2023 0204    ALKPHOS 56 04/15/2023 1508    AST 11 04/15/2023 1508    ALT 13 04/15/2023 1508    BILITOT 0.3 04/15/2023 1508        ains abnormal data LIPID PANEL BASIC  Order: [id number]  Component  Ref Range & Units 1 mo ago Resulting Agency   Cholesterol, Total  <200 mg/dL 203 [facility]   Comment: <200 mg/dL, Desirable   200-239 mg/dL, Borderline high  >239 mg/dL, High   Triglyceride  <150 mg/dL 50 [facility]   Comment: <150 mg/dL, Normal   150-199 mg/dL, Borderline high   200-499 mg/dL, High  >499 mg/dL, Very high   HDL Cholesterol  >39 mg/dL 99 [facility]   Comment:  40-59 mg/dL, Acceptable  >59 mg/dL, High: Negative risk factor for coronary heart disease  <40 mg/dL, Low: Positive risk factor for coronary heart disease   Non HDL Cholesterol  <130 mg/dL 104 [facility]   Comment: <130 mg/dL, Optimal   130-159 mg/dL, Near optimal/above optimal   160-189 mg/dL, Borderline high   190-219 mg/dL, High  >219 mg/dL, Very high  Secondary prevention optimal non HDL Cholesterol levels are recommended to be <100 mg/dL   Fasting Time  hrs 12 [facility]   VLDL Cholesterol  <30 mg/dL 10 [facility]   TC:HDL Ratio  <5.10 2.05 [facility]   LDL Cholesterol  <100 mg/dL 94 [facility]   Comment: <100 mg/dL, Optimal   100-129 mg/dL, Near optimal/above optimal   130-159 mg/dL, Borderline high   160-189 mg/dL, High  >189 mg/dL, Very high  Secondary prevention optimal LDL Cholesterol levels are recommended to be < 70 mg/dL   LDL:HDL Ratio  <2.54 0.95 [facility]          Specimen  "Collected: 09/02/23 10:10         5/20/2022     1:41 PM 10/24/2022     9:06 AM 11/7/2022    11:45 AM 12/14/2022     7:31 PM 12/15/2022     6:54 PM 1/17/2023    10:42 AM 3/23/2023     3:40 PM   Vitals   Systolic 155 102 125 107  121    Diastolic 82 62 66 50  60    Heart Rate 76  68 58  69 69   Temp 36.5 °C (97.7 °F)   36.3 °C (97.3 °F)      Resp 16  16 16  16    Height (in)  1.626 m (5' 4\") 1.626 m (5' 4\") 1.549 m (5' 0.98\") 1.548 m (5' 0.95\") 1.626 m (5' 4\") 1.626 m (5' 4\")   Weight (lb)  85.5 84 108.03 108.03 85 82.4   BMI  14.68 kg/m2 14.42 kg/m2 20.42 kg/m2 20.45 kg/m2 14.59 kg/m2 14.14 kg/m2   BSA (m2)  1.32 m2 1.31 m2 1.45 m2 1.45 m2 1.32 m2 1.3 m2         Objective     Mental Status Examination  General Appearance: Well groomed, appropriate eye contact.  Gait/Station: n/a  Speech: Normal rate, volume, prosody  Mood: fine  Affect: pleasant, full range   Thought Process: Linear, goal directed  Thought Associations:- intact   Thought Content: normal  Perception: No perceptual abnormalities noted  Level of Consciousness: Alert  Orientation: Alert and oriented to person, place, time and situation  Attention and Concentration: Intact  Recent Memory: fair   Remote Memory: caregiver helps with historical details   Executive function: needs assistance with IADLs   Language: no clear deficits, at baseline   Fund of knowledge: fair   Insight :fair   Judgment: fair         Assessment/Plan   69 y/o single female who presented 7/19/2021 with a long h/o excessive worry and there are associated somatic symptoms , OCD symptoms, depression, fatigue. She is disabled due to functional impairment - not able to be fully independent (? cognitive disorder, autistic spectrum ? ). Chronic complaints include anxiety/depression, fatigue, poor motivation. Addition of remeron had been partially helpful with mood and anxiety and addition of risperdal has helped with mood/negative rumination. No treatment changes at this time. No med SE, " although she admits to dry mouth,  no involuntary abnormal movements. There is no psychosis, death wish, SI, HI. She lives in [facility] and her friend Raul is very helpful.     She in on tegretol for seizures, recently saw neurology.   She is a poor historian and her friend clarifies historical details.       dx: PDD, mild-mod; OCD; AIYANA; r/o cog disorder     1) continue remeron 30 mg at night , citalopram 20 mg ,  risperdal 0.75 mg at night, reviewed r/b/a including metabolic monitoring and TD risk , patient gives IC   2) consider neuropsych testing to characterize cognitive difficulties   3) available labs reviewed, lipids and blood sugar completed 9/2023 - will request repeat lipid panel and glucose level from [facility]   4) f/u 8 weeks     [id number]- 18 minutes- motivational interviewing - Marshall is trying to attend more activities. We continue to discuss her anticipatory anxiety which can prevent her from going out. Encourage graded exposure, taking small steps. It continues to be hard for Marshall to turn her mind off, thought stopping. Commended for doing more and pushing herself more than she had in the past.  Marshall is fine going out when her friend is with her.  Has taken a few walks with a neighbor and has made plans to play games with a neighbor. Slowly making more friends.   Patient Active Problem List   Diagnosis    Physical deconditioning    Ambulatory dysfunction    Abnormal CBC    Anemia    Anxiety disorder    Asperger's disorder (HHS-HCC)    Chronic back pain    Closed nondisplaced oblique fracture of shaft of ulna with routine healing    Major depression    Dysphagia    Abdominal pain, periumbilical    Epigastric abdominal pain    Fatigue    Grand mal seizure (Multi)    Headache    Heart murmur    Hemangioma of skin and subcutaneous tissue    Hematuria    Hot flashes, menopausal    Humerus fracture    Hyperlipidemia    Increased frequency of urination    Chronic constipation    Irritable bowel syndrome with  constipation    Irritable bowel syndrome with diarrhea    Malnutrition (Multi)    Nevus, non-neoplastic    Osteoporosis    Other melanin hyperpigmentation    Other seborrheic keratosis    Pancreatic cyst (HHS-HCC)    Restless leg syndrome    Shoulder pain, left    Closed traumatic displaced fracture of distal end of radius with routine healing, subsequent encounter    Tubular adenoma of colon    Abdominal mass, LUQ (left upper quadrant)    Uterine mass    Vaginal atrophy    Vitamin D deficiency    Burst fracture of T12 vertebra (Multi)    Distal radius fracture, left    Left wrist pain    Odynophagia    Other intraarticular fracture of lower end of left radius, subsequent encounter for closed fracture with routine healing    Periprosthetic hip fracture, subsequent encounter    AIYANA (generalized anxiety disorder)    OCD (obsessive compulsive disorder)       Follow-up plan for depression was discussed with patient    Review with patient: Treatment plan reviewed with the patient.  Medication risks/benefit reviewed with the patient  Psychiatric Risk Assessment  Violence Risk Assessment: none  Acute Risk of Harm to Others is Considered: low   Suicide Risk Assessment: age > 65 yrs old and   Protective Factors against Suicide: adherence to  treatment, moral objections to suicide, and social support/connectedness  Acute Risk of Harm to Self is Considered: low    Rukhsana Kim, DO

## 2024-06-25 NOTE — PATIENT INSTRUCTIONS
If there are any thoughts of harming your self, harming others, concerning worsening of your mental health symptoms or concerning medication side effects, please call [phone] or reports to the nearest emergency room.     Contact via [employer] or call sooner ([phone]) in case of any questions or concerns.

## 2024-08-19 ENCOUNTER — APPOINTMENT (OUTPATIENT)
Dept: BEHAVIORAL HEALTH | Facility: CLINIC | Age: 69
End: 2024-08-19
Payer: MEDICARE

## 2024-08-19 DIAGNOSIS — F33.40 RECURRENT MAJOR DEPRESSIVE DISORDER, IN REMISSION (CMS-HCC): ICD-10-CM

## 2024-08-19 DIAGNOSIS — F42.9 OBSESSIVE-COMPULSIVE DISORDER, UNSPECIFIED TYPE: ICD-10-CM

## 2024-08-19 DIAGNOSIS — F41.1 GAD (GENERALIZED ANXIETY DISORDER): ICD-10-CM

## 2024-08-19 DIAGNOSIS — F33.9 RECURRENT MAJOR DEPRESSIVE DISORDER, REMISSION STATUS UNSPECIFIED (CMS-HCC): ICD-10-CM

## 2024-08-19 PROCEDURE — 1036F TOBACCO NON-USER: CPT | Performed by: PSYCHIATRY & NEUROLOGY

## 2024-08-19 PROCEDURE — 99214 OFFICE O/P EST MOD 30 MIN: CPT | Performed by: PSYCHIATRY & NEUROLOGY

## 2024-08-19 PROCEDURE — 1159F MED LIST DOCD IN RCRD: CPT | Performed by: PSYCHIATRY & NEUROLOGY

## 2024-08-19 PROCEDURE — 1160F RVW MEDS BY RX/DR IN RCRD: CPT | Performed by: PSYCHIATRY & NEUROLOGY

## 2024-08-19 RX ORDER — RISPERIDONE 0.25 MG/1
0.75 TABLET ORAL NIGHTLY
Qty: 270 TABLET | Refills: 0 | Status: SHIPPED | OUTPATIENT
Start: 2024-08-19 | End: 2024-11-17

## 2024-08-19 RX ORDER — CITALOPRAM 20 MG/1
20 TABLET, FILM COATED ORAL DAILY
Qty: 90 TABLET | Refills: 0 | Status: SHIPPED | OUTPATIENT
Start: 2024-08-19 | End: 2024-11-17

## 2024-08-19 RX ORDER — MIRTAZAPINE 30 MG/1
30 TABLET, FILM COATED ORAL NIGHTLY
Qty: 90 TABLET | Refills: 0 | Status: SHIPPED | OUTPATIENT
Start: 2024-08-19 | End: 2024-11-17

## 2024-08-19 NOTE — PROGRESS NOTES
Outpatient Psychiatry          Reason for Visit: follow up for PDD, mild-mod; OCD; AIYANA; r/o cog disorder .  Enjoyed celebrating recent birthday. She went out to lunch, received some cards.   -Joselaurence Torsten present for visit. She reports Marshall is making progress in attending more activities, a little more social , reading more, coloring.  Marshall has less catastrophic thinking.   -she has stumbled a few times which she reports to staff      HPI: 70 y/o single female who is being treated for depression . She reports mood is fine most of the time, She continues to work to not dwell on the negative.   She is tolerating remeron and Risperdal and no SE.   There is still chronic worry.   . There is still some degree of preoccupation with physical concerns. No longer calls Raul and her brother repeatedly.     Continues to be  more active, going on walks, more social and engaging more in activities.  Does word trivia and word games. Continues in balance class .     Denies new health issues. No falls   Sleeps well , appetite is ok. she is holding at 82 pounds  She would like to gain some weight.     There is no death wish or SI, hallucinations, paranoia, alfonso, wants things to be perfect and neat , counts to 50, has to check things many times, takes pills in and out of pill box.    she does not want to go to a nursing home.  5/2022 at ER as she she had inadvertently OD on tegretol, 4/24/2022 she fell and bumper her head which resulted in an ER visit.    PAST PSYCH H/O: last saw a psychiatrist over 2010 saw an NP in May 2021 X 1 and was prescribed abilify which she did not take   PAST PSYCH HOSP: none   PAST SELF HARM: none   past psych meds: abilify, celexa (since 2013) , trazadone , gabapentin , elavil , buspar , risperdal     DANI: Denies all substance abuse   FAMILY PSYCH H/O: brother - schizophrenia , alcohol abuse     reviewed past med h/o, meds, allergies    SOCIAL H/O: parents were optometrists, one living brother -,  one  brother. 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 Never , no children. She has never been able to be fully independent - unclear what the etiology is.        Current Medications:    Current Outpatient Medications:     acetaminophen (Tylenol) 325 mg tablet, Take 2 tablets (650 mg) by mouth every 4 hours if needed., Disp: , Rfl:     carBAMazepine (CarbatroL) 300 mg 12 hr capsule, Take 1 capsule (300 mg) by mouth 3 times a day. Needs appointment. Can see Brynn Lara NP ([phone]), Disp: 270 capsule, Rfl: 1    citalopram (CeleXA) 20 mg tablet, Take 1 tablet (20 mg) by mouth once daily., Disp: 30 tablet, Rfl: 2    mirtazapine (Remeron) 30 mg tablet, GIVE 1 TABLET BY MOUTH  DAILY AT BEDTIME, Disp: 30 tablet, Rfl: 0    multivitamin tablet, Take 1 tablet by mouth once daily., Disp: , Rfl:     omega-3 fatty acids-fish oil (Fish OiL) 340-1,000 mg capsule, Take 1 capsule by mouth once daily., Disp: , Rfl:     oxybutynin XL (Ditropan-XL) 10 mg 24 hr tablet, Take 1 tablet (10 mg) by mouth once daily., Disp: , Rfl:     risperiDONE (RisperDAL) 0.25 mg tablet, TAKE 3 TABLETS (0.75 MG) BY MOUTH ONCE DAILY AT BEDTIME., Disp: 90 tablet, Rfl: 0    simvastatin (Zocor) 20 mg tablet, Take 1 tablet (20 mg) by mouth once daily., Disp: , Rfl:   Medical History:  Past Medical History:   Diagnosis Date    Asymptomatic menopausal state     Menopause    Epilepsy, unspecified, not intractable, without status epilepticus (Multi) 2022    Epilepsy    Fracture of right shoulder girdle, part unspecified, initial encounter for closed fracture     Fracture of right shoulder     Surgical History:  Past Surgical History:   Procedure Laterality Date    KNEE ARTHROSCOPY W/ DEBRIDEMENT  2014    Arthroscopy Knee    MR HEAD ANGIO WO IV CONTRAST  12/15/2022    MR HEAD ANGIO WO IV CONTRAST 12/15/2022 [facility]CTOR [facility]    MR NECK ANGIO  WO IV CONTRAST  12/15/2022    MR NECK ANGIO WO IV CONTRAST 12/15/2022 DOCTOR OFFICE [facility]GAC[facility]    TONSILLECTOMY  02/13/2014    Tonsillectomy With Adenoidectomy     Family History:  Family History   Problem Relation Name Age of Onset    Hyperlipidemia Mother      Hypertension Mother      Irritable bowel syndrome Mother       Social History:  Social History     Socioeconomic History    Marital status: Single     Spouse name: Not on file    Number of children: Not on file    Years of education: Not on file    Highest education level: Not on file   Occupational History    Not on file   Tobacco Use    Smoking status: Never    Smokeless tobacco: Never   Substance and Sexual Activity    Alcohol use: Not on file    Drug use: Not on file    Sexual activity: Not on file   Other Topics Concern    Not on file   Social History Narrative    Not on file     Social Determinants of Health     Financial Resource Strain: Not on file   Food Insecurity: Not on file   Transportation Needs: Not on file   Physical Activity: Not on file   Stress: Not on file   Social Connections: Not on file   Intimate Partner Violence: Not on file   Housing Stability: Not on file       Medical Review Of Systems:  Pertinent items are noted in HPI.    Psychiatric Review Of Systems:  Psychiatric ROS - Adult  Anxiety: General Anxiety Disorder (AIYANA)AIYANA Behaviors: difficult to control worry, excessive anxiety/worry, difficulty concentrating, and irritability  Depression: most of the time mood is ok, able to enjoy things   Delirium: negative  Psychosis: negative  Codi: negative  Safety Issues: none  Psychiatric ROS Comment: less persistent rumination/worry  Disordered Eating Symptoms:n/a   Inattentive Symptoms: n/a    Hyperactive/Impulsive Symptoms:n/a     Trauma Symptoms:n/a       There were no vitals taken for this visit.     LABS  Lab Results   Component Value Date    TSH 1.11 05/20/2022    [id number] 1,582 (H) 04/27/2022        Chemistry    Lab Results    Component Value Date/Time    NA CANCELED 04/20/2023 0204    K CANCELED 04/20/2023 0204    CL CANCELED 04/20/2023 0204    CO2 CANCELED 04/20/2023 0204    BUN CANCELED 04/20/2023 0204    CREATININE CANCELED 04/20/2023 0204    Lab Results   Component Value Date/Time    CALCIUM CANCELED 04/20/2023 0204    ALKPHOS 56 04/15/2023 1508    AST 11 04/15/2023 1508    ALT 13 04/15/2023 1508    BILITOT 0.3 04/15/2023 1508        ains abnormal data LIPID PANEL BASIC  Order: [id number]  Component  Ref Range & Units 1 mo ago Resulting Agency   Cholesterol, Total  <200 mg/dL 203 High  [facility]   Comment: <200 mg/dL, Desirable   200-239 mg/dL, Borderline high  >239 mg/dL, High   Triglyceride  <150 mg/dL 50 [facility]   Comment: <150 mg/dL, Normal   150-199 mg/dL, Borderline high   200-499 mg/dL, High  >499 mg/dL, Very high   HDL Cholesterol  >39 mg/dL 99 [facility]   Comment:  40-59 mg/dL, Acceptable  >59 mg/dL, High: Negative risk factor for coronary heart disease  <40 mg/dL, Low: Positive risk factor for coronary heart disease   Non HDL Cholesterol  <130 mg/dL 104 [facility]   Comment: <130 mg/dL, Optimal   130-159 mg/dL, Near optimal/above optimal   160-189 mg/dL, Borderline high   190-219 mg/dL, High  >219 mg/dL, Very high  Secondary prevention optimal non HDL Cholesterol levels are recommended to be <100 mg/dL   Fasting Time  hrs 12 [facility]   VLDL Cholesterol  <30 mg/dL 10 [facility]   TC:HDL Ratio  <5.10 2.05 [facility]   LDL Cholesterol  <100 mg/dL 94 [facility]   Comment: <100 mg/dL, Optimal   100-129 mg/dL, Near optimal/above optimal   130-159 mg/dL, Borderline high   160-189 mg/dL, High  >189 mg/dL, Very high  Secondary prevention optimal LDL Cholesterol levels are recommended to be < 70 mg/dL   LDL:HDL Ratio  <2.54 0.95 [facility]     "      Specimen Collected: 09/02/23 10:10         5/20/2022     1:41 PM 10/24/2022     9:06 AM 11/7/2022    11:45 AM 12/14/2022     7:31 PM 12/15/2022     6:54 PM 1/17/2023    10:42 AM 3/23/2023     3:40 PM   Vitals   Systolic 155 102 125 107  121    Diastolic 82 62 66 50  60    Heart Rate 76  68 58  69 69   Temp 36.5 °C (97.7 °F)   36.3 °C (97.3 °F)      Resp 16  16 16  16    Height (in)  1.626 m (5' 4\") 1.626 m (5' 4\") 1.549 m (5' 0.98\") 1.548 m (5' 0.95\") 1.626 m (5' 4\") 1.626 m (5' 4\")   Weight (lb)  85.5 84 108.03 108.03 85 82.4   BMI  14.68 kg/m2 14.42 kg/m2 20.42 kg/m2 20.45 kg/m2 14.59 kg/m2 14.14 kg/m2   BSA (m2)  1.32 m2 1.31 m2 1.45 m2 1.45 m2 1.32 m2 1.3 m2         Objective     Mental Status Examination  General Appearance: Well groomed, appropriate eye contact.  Gait/Station: n/a  Speech: Normal rate, volume, prosody  Mood: fine  Affect: pleasant, full range   Thought Process: Linear, goal directed  Thought Associations:- intact   Thought Content: normal  Perception: No perceptual abnormalities noted  Level of Consciousness: Alert  Orientation: Alert and oriented to person, place, time and situation  Attention and Concentration: Intact  Recent Memory: fair   Remote Memory: caregiver helps with historical details   Executive function: needs assistance with IADLs   Language: no clear deficits, at baseline   Fund of knowledge: fair   Insight :fair   Judgment: fair         Assessment/Plan   69 y/o single female who presented 7/19/2021 with a long h/o excessive worry and there are associated somatic symptoms , OCD symptoms, depression, fatigue. She is disabled due to functional impairment - not able to be fully independent (? cognitive disorder, autistic spectrum ? ). Chronic complaints include anxiety/depression, fatigue, poor motivation. Addition of remeron had been partially helpful with mood and anxiety and addition of risperdal has helped with mood/negative rumination. No treatment changes at this time. " No med SE, although she admits to dry mouth,  no involuntary abnormal movements. There is no psychosis, death wish, SI, HI. She lives in ILF and her friend Raul is very helpful.      She in on tegretol for seizures, recently saw neurology.   She is a poor historian and her friend clarifies historical details.         dx: PDD, mild-mod; OCD; AIYANA; r/o cog disorder      1) continue remeron 30 mg at night , citalopram 20 mg ,  risperdal 0.75 mg at night, reviewed r/b/a including metabolic monitoring and TD risk , patient gives IC   2) consider neuropsych testing to characterize cognitive difficulties   3) available labs reviewed, lipids and blood sugar completed 9/2023 - will request repeat lipid panel and glucose level from f   4) f/u 8 weeks        [id number]   Patient Active Problem List   Diagnosis    Physical deconditioning    Ambulatory dysfunction    Abnormal CBC    Anemia    Anxiety disorder    Asperger's disorder (HHS-HCC)    Chronic back pain    Closed nondisplaced oblique fracture of shaft of ulna with routine healing    Major depression    Dysphagia    Abdominal pain, periumbilical    Epigastric abdominal pain    Fatigue    Grand mal seizure (Multi)    Headache    Heart murmur    Hemangioma of skin and subcutaneous tissue    Hematuria    Hot flashes, menopausal    Humerus fracture    Hyperlipidemia    Increased frequency of urination    Chronic constipation    Irritable bowel syndrome with constipation    Irritable bowel syndrome with diarrhea    Malnutrition (Multi)    Nevus, non-neoplastic    Osteoporosis    Other melanin hyperpigmentation    Other seborrheic keratosis    Pancreatic cyst (HHS-HCC)    Restless leg syndrome    Shoulder pain, left    Closed traumatic displaced fracture of distal end of radius with routine healing, subsequent encounter    Tubular adenoma of colon    Abdominal mass, LUQ (left upper quadrant)    Uterine mass    Vaginal atrophy    Vitamin D deficiency    Burst fracture of T12  vertebra (Multi)    Distal radius fracture, left    Left wrist pain    Odynophagia    Other intraarticular fracture of lower end of left radius, subsequent encounter for closed fracture with routine healing    Periprosthetic hip fracture, subsequent encounter    AIYANA (generalized anxiety disorder)    OCD (obsessive compulsive disorder)       Follow-up plan for depression was discussed with patient    Review with patient: Treatment plan reviewed with the patient.  Medication risks/benefit reviewed with the patient  Psychiatric Risk Assessment  Violence Risk Assessment: none  Acute Risk of Harm to Others is Considered: low   Suicide Risk Assessment: age > 65 yrs old and   Protective Factors against Suicide: adherence to  treatment, moral objections to suicide, and social support/connectedness  Acute Risk of Harm to Self is Considered: low    Rukhsana Kim, DO

## 2024-08-27 ENCOUNTER — OFFICE VISIT (OUTPATIENT)
Dept: NEUROLOGY | Facility: CLINIC | Age: 69
End: 2024-08-27
Payer: MEDICARE

## 2024-08-27 VITALS
BODY MASS INDEX: 13.73 KG/M2 | HEART RATE: 78 BPM | SYSTOLIC BLOOD PRESSURE: 99 MMHG | DIASTOLIC BLOOD PRESSURE: 62 MMHG | WEIGHT: 80 LBS | RESPIRATION RATE: 18 BRPM

## 2024-08-27 DIAGNOSIS — R56.9 SEIZURE (MULTI): Primary | ICD-10-CM

## 2024-08-27 DIAGNOSIS — G40.409 GRAND MAL SEIZURE (MULTI): ICD-10-CM

## 2024-08-27 PROCEDURE — 1159F MED LIST DOCD IN RCRD: CPT | Performed by: NURSE PRACTITIONER

## 2024-08-27 PROCEDURE — 99214 OFFICE O/P EST MOD 30 MIN: CPT | Performed by: NURSE PRACTITIONER

## 2024-08-27 PROCEDURE — 1126F AMNT PAIN NOTED NONE PRSNT: CPT | Performed by: NURSE PRACTITIONER

## 2024-08-27 RX ORDER — CARBAMAZEPINE 300 MG/1
300 CAPSULE, EXTENDED RELEASE ORAL 3 TIMES DAILY
Qty: 270 CAPSULE | Refills: 3 | Status: SHIPPED | OUTPATIENT
Start: 2024-08-27 | End: 2025-08-27

## 2024-08-27 ASSESSMENT — PAIN SCALES - GENERAL: PAINLEVEL: 0-NO PAIN

## 2024-08-27 NOTE — PROGRESS NOTES
"[facility]   Epilepsy    Patient ID: Marshall Armenta 69 y.o.female presenting in follow-up for previously diagnosed epilepsy  Patient of: Dr. Prince Krishnamurthy    [facility]  --------------- Classification ---------------  EPILEPTIC Paroxysmal Episodes  Semiology:  1. Generalized tonic-clonic seizure*  -Onset: Childhood  -Frequency: Unknown  -Last Sz: ~1998  EZ: Unknown, presumed generalized  Etiology: Unknown, presumed genetic  Comorbidities: anxiety, depression, fibromyalgia, IBS, HLD     Prior AEDs: PHT  Current AEDs: -300-300 (lvl 18; Na+ 140 on 5/20/2022), GBP 300qhs     EEG: None in system  CTH (5/20/2022): Mild WM change, otherwise unremarkable    EPISODE HISTORY:  Patient's first seizure occurred sometime in her childhood. She does not recall anything about her events, but has been told she has \"grand mal\" seizures. She was treated with phenytoin until adulthood, when she was switched to carbamazepine, eventually settling on CBZ--300-300. On this regimen, she has remained seizure free for over 25 years. She notes good adherence to the medication regimen, and has tolerated it very well. She denies having been on a She does not drive, and lives in an independent living facility.      10/11/2022: Initial  Epilepsy Presentation to reconnect with a neurologist due to a series of various concerns. Per patient and her family friend (who has known patient for many years, and presents at the visit today), Over the last 2 years, she has had a significant decline in her health. She states this focuses primarily on her ability to do things, which have been centered around significant anxiety in the setting of COVID, as well as continued deconditioning in that time. She has more recently in the past few months been complaining of falls, and even broken a hip. While this is occur throughout the day, she finds that it occurs mostly at night. PatientStates she feels weak, though cites discomfort in her limbs " (particularly her left shoulder), as being mostly responsible for this. She is on gabapentin for the same. She has essentially lost interest in all of the things that she is enjoys doing, and has been staying in her room more while she used to be quite social. The reason she gets her this is that she has had fear of falling, and has not wanted to risk going out because of it. It is of note however, that she has been working with physical therapy, and went from a walker to not longer requiring one.     12/16/22: admitted to [facility]: She had a fall in her home at an assisted living facility two days ago. She'd been prepping for a colonoscopy and was having active diarrhea, She slipped and fell, landing on her left side. She thinks she might have struck her head; she has daily, intermittent holocranial headaches at baseline, but had more severe lateral headaches (both sides) after the fall. She is not sure who summoned building management, but someone summoned EMS and she was brought to the [facility] ED. She was admitted after this fall for management of spinal compression fractures. She was about to have a spine MRI when she was witnessed to stop talking, and to have tremor of the arms. This lasted a few minutes and by the time Dr. Uribe arrived, the patient was no longer tremoring, but was confused and starting to awaken. No medication was given, but cranial MRI and cranial/cervical MRA were added to the study. She has had no other events since then.  Convulsive syncope is most likely, given overall presentation.   cannot  absolutely rule out a generalized seizure, as with bowel prep, she could have flushed out the carbamazepine ER tabs she took and so level could have been low, or if she struck her head, she could have had a post-traumatic seizure     Continue home dose of carbamazepine ER.      RESULTS:  No CT head results found for the past 12 months     No CT head results found for the past 14 days             No EEG  results found for the past 12 months                No EEG results found for the past 14 days                         No MRI head results found for the past 12 months      EEG:  No EEG results found for the past 12 months                   PRESENT CONCERNS:  She is doing well today. No seizures in >25 years. Had recent risperidone increase by psychiatry and this has made a goo impact on her mood. She is overall sleeping well at night, sometimes has nights where she wakes up and can't fall back asleep.       Review of Systems  All other systems reviewed and negative unless otherwise stated above    CONTROLLED SUBSTANCE  N/a    Vitals:  Vitals:    08/27/24 1122   BP: 99/62   Pulse: 78   Resp: 18       PHYSICAL EXAM:  Neurologic Exam   The patient was alert and oriented to person, time, and place.   Anxious appearing,   Speech was fluent without dysarthria.   Good historian, good grasp of current events.  EOM intact  Face symmetric to activation  Gait normal     ASSESSMENT & PLAN:   69 y.o. female presenting in follow-up for previously diagnosed epilepsy    Problem List Items Addressed This Visit       Grand mal seizure (Multi)    Relevant Medications    carBAMazepine (CarbatroL) 300 mg 12 hr capsule     Other Visit Diagnoses       Seizure (Multi)    -  Primary    Relevant Orders    Carbamazepine          Well controlled for >25 years with no seizures       Continue -300-300  Serum level  RTC 12-24 months   because you have not lost awareness you are not under restrictions at this time  Call me with any seizures prior to your next appointment   Given my contact information/instructions for Mine

## 2024-08-27 NOTE — PATIENT INSTRUCTIONS
"Thank you for coming to the Epilepsy Clinic today.    -If you have any sudden new, concerning or worsening symptoms, call 911 and go to the Emergency Room. Otherwise, it was good seeing you today-  -  Your seizures are well controlled on the current medication. Additional prescription refills was sent to the pharmacy.    As we discussed, because you have not had any events or seizures where you lose awareness or control of your actions you have no restrictions at this time. However, if you have an event of seizure where this were to occur, you must inform our office, and we will reassess things. In this event, you stop driving, using heavy machinery, climbing heights, or engaging in any other activity that would cause harm/death to yourself or others were you to lose awareness/consciousness and have a seizure. These restrictions would need to stay in place until at least 6 months of seizure freedom and clearance your physician.    -HOW TO CONTACT TOMASZ RODRIGUEZ EPILEPSY NURSE PRACTITIONER ([phone]).   Instructed to call in the event of seizure, medication refills, or any questions  *Please allow 24-48 hours for non-urgent responses*.  For emergency concerns, please dial 911 or present to the nearest emergency room.  For concerns after business hours (8am-4:30pm) or on weekends please call [phone]  To call and schedule a follow up appointment please call [phone]  -Paperwork may take up to 3 business days to complete-    Every attempt is made to run on time for your appointment, if you are 15 minutes or later for your appoinement you may be asked to reschedule    -Compliance education: It is important to continue to try and achieve seizure control because of the potential for injury and illness due to seizures. In a very small minority of patients with generalized tonic clonic seizures (\"grand mal\"), breathing or heart function can stop during a seizure and result in demise (sudden unexpected death in " epilepsy or SUDEP). Freedom from seizures prevents this kind of outcome-     I have ordered blood work for you to have completed at the lab. You do not need to bring any paperwork with you if you are going to a [facility]. The results will automatically come to me and I will call or message you with results.

## 2024-09-09 ENCOUNTER — APPOINTMENT (OUTPATIENT)
Dept: GASTROENTEROLOGY | Facility: CLINIC | Age: 69
End: 2024-09-09
Payer: MEDICARE

## 2024-09-09 ENCOUNTER — LAB (OUTPATIENT)
Dept: LAB | Facility: LAB | Age: 69
End: 2024-09-09
Payer: MEDICARE

## 2024-09-09 VITALS — OXYGEN SATURATION: 97 % | HEART RATE: 78 BPM | HEIGHT: 64 IN | WEIGHT: 82 LBS | BODY MASS INDEX: 14 KG/M2

## 2024-09-09 DIAGNOSIS — K58.1 IRRITABLE BOWEL SYNDROME WITH CONSTIPATION: ICD-10-CM

## 2024-09-09 DIAGNOSIS — R13.14 PHARYNGOESOPHAGEAL DYSPHAGIA: ICD-10-CM

## 2024-09-09 DIAGNOSIS — R56.9 SEIZURE (MULTI): ICD-10-CM

## 2024-09-09 DIAGNOSIS — K59.09 CHRONIC CONSTIPATION: Primary | ICD-10-CM

## 2024-09-09 LAB — CARBAMAZEPINE SERPL-MCNC: 9.9 UG/ML (ref 4–12)

## 2024-09-09 PROCEDURE — 1159F MED LIST DOCD IN RCRD: CPT | Performed by: INTERNAL MEDICINE

## 2024-09-09 PROCEDURE — 80156 ASSAY CARBAMAZEPINE TOTAL: CPT

## 2024-09-09 PROCEDURE — 99214 OFFICE O/P EST MOD 30 MIN: CPT | Performed by: INTERNAL MEDICINE

## 2024-09-09 PROCEDURE — 1160F RVW MEDS BY RX/DR IN RCRD: CPT | Performed by: INTERNAL MEDICINE

## 2024-09-09 PROCEDURE — 36415 COLL VENOUS BLD VENIPUNCTURE: CPT

## 2024-09-09 PROCEDURE — 3008F BODY MASS INDEX DOCD: CPT | Performed by: INTERNAL MEDICINE

## 2024-09-09 PROCEDURE — 1036F TOBACCO NON-USER: CPT | Performed by: INTERNAL MEDICINE

## 2024-09-09 NOTE — PROGRESS NOTES
"Subjective     History of Present Illness:   Marshall Armenta is a 69 y.o. female who presents to GI clinic for   \"Acid reflux\"  Foods \"don't taste right\".  Sometimes difficulty swallowing.    BM every 3-4 days.    Diet reviewed.    Review of Systems  Review of Systems    Social History   reports that she has never smoked. She has never used smokeless tobacco.     Allergies  Allergies   Allergen Reactions    Penicillins Unknown    Antihistamine-1 Rash     Antihistamine TABS       Medications  Current Outpatient Medications   Medication Instructions    acetaminophen (TYLENOL) 650 mg, oral, Every 4 hours PRN    carBAMazepine (CARBATROL) 300 mg, oral, 3 times daily    citalopram (CELEXA) 20 mg, oral, Daily    mirtazapine (REMERON) 30 mg, oral, Nightly    multivitamin tablet 1 tablet, oral, Daily    omega-3 fatty acids-fish oil (Fish OiL) 340-1,000 mg capsule 1 capsule, oral, Daily    oxybutynin XL (Ditropan-XL) 10 mg 24 hr tablet 1 tablet, oral, Daily    risperiDONE (RISPERDAL) 0.75 mg, oral, Nightly    simvastatin (Zocor) 20 mg tablet 1 tablet, oral, Daily        Objective   Visit Vitals  Pulse 78      Physical Exam  Constitutional:       Appearance: Normal appearance.   HENT:      Mouth/Throat:      Mouth: Mucous membranes are moist.      Pharynx: Oropharynx is clear.   Eyes:      Extraocular Movements: Extraocular movements intact.      Pupils: Pupils are equal, round, and reactive to light.   Cardiovascular:      Rate and Rhythm: Normal rate and regular rhythm.      Heart sounds: No murmur heard.  Pulmonary:      Effort: Pulmonary effort is normal.      Breath sounds: Normal breath sounds.   Abdominal:      General: Abdomen is flat. Bowel sounds are normal.      Palpations: Abdomen is soft. There is no mass.      Comments: Mild diffuse tenderness   Skin:     General: Skin is warm and dry.   Neurological:      Mental Status: She is alert.   Psychiatric:         Mood and Affect: Mood normal.         Behavior: Behavior " normal.         Thought Content: Thought content normal.         Judgment: Judgment normal.                     Assessment/Plan   Marshall Armenta is a 69 y.o. female who presents to GI clinic for   GERD/dysphagia/IBS-C.  Recommend:    Add Miralax daily    More fruit    Eat slowly, chew carefully.      Kashif Shanks MD

## 2024-10-21 ENCOUNTER — APPOINTMENT (OUTPATIENT)
Dept: BEHAVIORAL HEALTH | Facility: CLINIC | Age: 69
End: 2024-10-21
Payer: MEDICARE

## 2024-10-21 DIAGNOSIS — F33.40 RECURRENT MAJOR DEPRESSIVE DISORDER, IN REMISSION (CMS-HCC): ICD-10-CM

## 2024-10-21 DIAGNOSIS — F42.9 OBSESSIVE-COMPULSIVE DISORDER, UNSPECIFIED TYPE: ICD-10-CM

## 2024-10-21 DIAGNOSIS — F33.9 RECURRENT MAJOR DEPRESSIVE DISORDER, REMISSION STATUS UNSPECIFIED (CMS-HCC): ICD-10-CM

## 2024-10-21 DIAGNOSIS — F41.1 GAD (GENERALIZED ANXIETY DISORDER): ICD-10-CM

## 2024-10-21 PROCEDURE — 99214 OFFICE O/P EST MOD 30 MIN: CPT | Performed by: PSYCHIATRY & NEUROLOGY

## 2024-10-21 PROCEDURE — 1160F RVW MEDS BY RX/DR IN RCRD: CPT | Performed by: PSYCHIATRY & NEUROLOGY

## 2024-10-21 PROCEDURE — 1159F MED LIST DOCD IN RCRD: CPT | Performed by: PSYCHIATRY & NEUROLOGY

## 2024-10-21 PROCEDURE — 1036F TOBACCO NON-USER: CPT | Performed by: PSYCHIATRY & NEUROLOGY

## 2024-10-21 RX ORDER — CITALOPRAM 20 MG/1
20 TABLET, FILM COATED ORAL DAILY
Qty: 90 TABLET | Refills: 0 | Status: SHIPPED | OUTPATIENT
Start: 2024-10-21 | End: 2025-01-19

## 2024-10-21 RX ORDER — MIRTAZAPINE 30 MG/1
30 TABLET, FILM COATED ORAL NIGHTLY
Qty: 90 TABLET | Refills: 0 | Status: SHIPPED | OUTPATIENT
Start: 2024-10-21 | End: 2025-01-19

## 2024-10-21 RX ORDER — RISPERIDONE 0.25 MG/1
0.75 TABLET ORAL NIGHTLY
Qty: 270 TABLET | Refills: 0 | Status: SHIPPED | OUTPATIENT
Start: 2024-10-21 | End: 2025-01-19

## 2024-10-21 NOTE — PATIENT INSTRUCTIONS
If there are any thoughts of harming your self, harming others, concerning worsening of your mental health symptoms or concerning medication side effects, please call [phone] or reports to the nearest emergency room.     Contact via [employer] or call sooner ([phone]) in case of any questions or concerns.

## 2024-10-21 NOTE — PROGRESS NOTES
"Outpatient Psychiatry          Reason for Visit: follow up for PDD, mild-mod; OCD; AIYANA; r/o cog disorder .  \"Pretty good, I try to be in a good mood\". Going to more activities, coloring, TV, word games.   -Raul Sarmiento present for visit.   -one small slide since last seen, she was a little bruised.     HPI: 68 y/o single female who is being treated for depression . She reports mood is fine most of the time,  She is tolerating remeron and Risperdal and no SE.  Mood is better and she is doing more.   There is still chronic worry, but less distressed. Less counting/rituals.   There is still some degree of preoccupation with physical concerns. No longer calls Raul and her brother repeatedly.   -no new health issues    Continues to be  more active, going on walks, more social and engaging more in activities.  Does word trivia and word games. Continues in balance class .    Sleeps well , appetite is ok. she is holding at 82 pounds  She would like to gain some weight.     There is no death wish or SI, hallucinations, paranoia, alfonso, wants things to be perfect and neat , counts to 50, has to check things many times, takes pills in and out of pill box.    she does not want to go to a nursing home.  2022 at ER as she she had inadvertently OD on tegretol, 2022 she fell and bumper her head which resulted in an ER visit.    PAST PSYCH H/O: last saw a psychiatrist over  saw an NP in May 2021 X 1 and was prescribed abilify which she did not take   PAST PSYCH HOSP: none   PAST SELF HARM: none   past psych meds: abilify, celexa (since ) , trazadone , gabapentin , elavil , buspar , risperdal     DANI: Denies all substance abuse   FAMILY PSYCH H/O: brother - schizophrenia , alcohol abuse     reviewed past med h/o, meds, allergies    SOCIAL H/O: parents were optometrists, one living brother -, one  brother. 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 Never , no children. She has never been able to be fully independent - unclear what the etiology is.        Current Medications:    Current Outpatient Medications:     acetaminophen (Tylenol) 325 mg tablet, Take 2 tablets (650 mg) by mouth every 4 hours if needed., Disp: , Rfl:     carBAMazepine (CarbatroL) 300 mg 12 hr capsule, Take 1 capsule (300 mg) by mouth 3 times a day., Disp: 270 capsule, Rfl: 3    citalopram (CeleXA) 20 mg tablet, Take 1 tablet (20 mg) by mouth once daily., Disp: 90 tablet, Rfl: 0    mirtazapine (Remeron) 30 mg tablet, Take 1 tablet (30 mg) by mouth once daily at bedtime., Disp: 90 tablet, Rfl: 0    multivitamin tablet, Take 1 tablet by mouth once daily., Disp: , Rfl:     omega-3 fatty acids-fish oil (Fish OiL) 340-1,000 mg capsule, Take 1 capsule by mouth once daily., Disp: , Rfl:     oxybutynin XL (Ditropan-XL) 10 mg 24 hr tablet, Take 1 tablet (10 mg) by mouth once daily., Disp: , Rfl:     risperiDONE (RisperDAL) 0.25 mg tablet, Take 3 tablets (0.75 mg) by mouth once daily at bedtime., Disp: 270 tablet, Rfl: 0    simvastatin (Zocor) 20 mg tablet, Take 1 tablet (20 mg) by mouth once daily., Disp: , Rfl:   Medical History:  Past Medical History:   Diagnosis Date    Asymptomatic menopausal state     Menopause    Epilepsy, unspecified, not intractable, without status epilepticus 11/07/2022    Epilepsy    Fracture of right shoulder girdle, part unspecified, initial encounter for closed fracture     Fracture of right shoulder     Surgical History:  Past Surgical History:   Procedure Laterality Date    KNEE ARTHROSCOPY W/ DEBRIDEMENT  02/13/2014    Arthroscopy Knee    MR HEAD ANGIO WO IV CONTRAST  12/15/2022    MR HEAD ANGIO WO IV CONTRAST 12/15/2022 [facility]    MR NECK ANGIO WO IV CONTRAST  12/15/2022    MR NECK ANGIO WO IV CONTRAST 12/15/2022 [facility]    TONSILLECTOMY  02/13/2014    Tonsillectomy With Adenoidectomy     Family  History:  Family History   Problem Relation Name Age of Onset    Hyperlipidemia Mother      Hypertension Mother      Irritable bowel syndrome Mother       Social History:  Social History     Socioeconomic History    Marital status: Single     Spouse name: Not on file    Number of children: Not on file    Years of education: Not on file    Highest education level: Not on file   Occupational History    Not on file   Tobacco Use    Smoking status: Never    Smokeless tobacco: Never   Substance and Sexual Activity    Alcohol use: Not on file    Drug use: Not on file    Sexual activity: Not on file   Other Topics Concern    Not on file   Social History Narrative    Not on file     Social Drivers of Health     Financial Resource Strain: Not on file   Food Insecurity: Not on file   Transportation Needs: Not on file   Physical Activity: Not on file   Stress: Not on file   Social Connections: Not on file   Intimate Partner Violence: Not on file   Housing Stability: Not on file       Medical Review Of Systems:  Pertinent items are noted in HPI.    Psychiatric Review Of Systems:  Psychiatric ROS - Adult  Anxiety: General Anxiety Disorder (AIYANA)AIYANA Behaviors: difficult to control worry, excessive anxiety/worry, difficulty concentrating, and irritability  Depression: most of the time mood is ok, able to enjoy things   Delirium: negative  Psychosis: negative  Codi: negative  Safety Issues: none  Psychiatric ROS Comment: less persistent rumination/worry  Disordered Eating Symptoms:n/a   Inattentive Symptoms: n/a    Hyperactive/Impulsive Symptoms:n/a     Trauma Symptoms:n/a       There were no vitals taken for this visit.     LABS  Lab Results   Component Value Date    TSH 1.11 05/20/2022    ZZMXORTR54 1,582 (H) 04/27/2022        Chemistry    Lab Results   Component Value Date/Time    NA CANCELED 04/20/2023 0204    K CANCELED 04/20/2023 0204    CL CANCELED 04/20/2023 0204    CO2 CANCELED 04/20/2023 0204    BUN CANCELED 04/20/2023  0204    CREATININE CANCELED 04/20/2023 0204    Lab Results   Component Value Date/Time    CALCIUM CANCELED 04/20/2023 0204    ALKPHOS 56 04/15/2023 1508    AST 11 04/15/2023 1508    ALT 13 04/15/2023 1508    BILITOT 0.3 04/15/2023 1508        ains abnormal data LIPID PANEL BASIC  Order: [id number]  Component  Ref Range & Units 1 mo ago Resulting Agency   Cholesterol, Total  <200 mg/dL 203 [facility]  [facility]   Comment: <200 mg/dL, Desirable   200-239 mg/dL, Borderline high  >239 mg/dL, High   Triglyceride  <150 mg/dL 50 [facility]   Comment: <150 mg/dL, Normal   150-199 mg/dL, Borderline high   200-499 mg/dL, High  >499 mg/dL, Very high   HDL Cholesterol  >39 mg/dL 99 [facility]   Comment:  40-59 mg/dL, Acceptable  >59 mg/dL, High: Negative risk factor for coronary heart disease  <40 mg/dL, Low: Positive risk factor for coronary heart disease   Non HDL Cholesterol  <130 mg/dL 104 [facility]   Comment: <130 mg/dL, Optimal   130-159 mg/dL, Near optimal/above optimal   160-189 mg/dL, Borderline high   190-219 mg/dL, High  >219 mg/dL, Very high  Secondary prevention optimal non HDL Cholesterol levels are recommended to be <100 mg/dL   Fasting Time  hrs 12 [facility]   VLDL Cholesterol  <30 mg/dL 10 [facility]   TC:HDL Ratio  <5.10 2.05 [facility]   LDL Cholesterol  <100 mg/dL 94 [facility]   Comment: <100 mg/dL, Optimal   100-129 mg/dL, Near optimal/above optimal   130-159 mg/dL, Borderline high   160-189 mg/dL, High  >189 mg/dL, Very high  Secondary prevention optimal LDL Cholesterol levels are recommended to be < 70 mg/dL   LDL:HDL Ratio  <2.54 0.95 [facility]          Specimen Collected: 09/02/23 10:10         11/7/2022    11:45 AM 12/14/2022     7:31 PM 12/15/2022     6:54 PM 1/17/2023    10:42 AM 3/23/2023     3:40 PM 8/27/2024    11:22 AM  "9/9/2024     2:56 PM   Vitals   Systolic 125 107  121  99    Diastolic 66 50  60  62    Heart Rate 68 58  69 69 78 78   Temp  36.3 °C (97.3 °F)        Resp 16 16  16  18    Height (in) 1.626 m (5' 4\") 1.549 m (5' 0.98\") 1.548 m (5' 0.95\") 1.626 m (5' 4\") 1.626 m (5' 4\")  1.626 m (5' 4\")   Weight (lb) 84 108.03 108.03 85 82.4 80 82   BMI 14.42 kg/m2 20.42 kg/m2 20.45 kg/m2 14.59 kg/m2 14.14 kg/m2 13.73 kg/m2 14.08 kg/m2   BSA (m2) 1.31 m2 1.45 m2 1.45 m2 1.32 m2 1.3 m2 1.28 m2 1.3 m2   Visit Report      Report Report         Objective     Mental Status Examination  General Appearance: Well groomed, appropriate eye contact.  Gait/Station: n/a  Speech: Normal rate, volume, prosody, some stuttering   Mood: fine  Affect: pleasant, full range   Thought Process: Linear, goal directed  Thought Associations:- intact   Thought Content: normal  Perception: No perceptual abnormalities noted  Level of Consciousness: Alert  Orientation: Alert and oriented to person, place, time and situation  Attention and Concentration: Intact  Recent Memory: fair   Remote Memory: caregiver helps with historical details   Executive function: needs assistance with IADLs   Language: no clear deficits, at baseline   Fund of knowledge: fair   Insight :fair   Judgment: fair         Assessment/Plan   68 y/o single female who presented 7/19/2021 with a long h/o excessive worry and there are associated somatic symptoms , OCD symptoms, depression, fatigue. She is disabled due to functional impairment - not able to be fully independent (? cognitive disorder, autistic spectrum ? ). Chronic complaints include anxiety/depression, fatigue, poor motivation. Addition of remeron had been partially helpful with mood and anxiety and with addition of risperdal there has been significant mood improvement and better control of anxiety.  No treatment changes at this time. No med SE, although she admits to dry mouth,  no involuntary abnormal movements. There is no " psychosis, death wish, SI, HI. She lives in [facility] and her friend Raul is very helpful.      She in on tegretol for seizures.  She is a poor historian and her friend clarifies historical details.         dx: PDD, mild-mod; OCD; AIYANA; r/o cog disorder      1) continue remeron 30 mg at night , citalopram 20 mg ,  risperdal 0.75 mg at night, reviewed r/b/a including metabolic monitoring and TD risk , patient gives IC   2) consider neuropsych testing to characterize cognitive difficulties   3) available labs reviewed, lipids and blood sugar completed 7/2024, vit d will be stopped due to elevated levels   4) f/u 8 weeks        49478-   Patient Active Problem List   Diagnosis    Physical deconditioning    Ambulatory dysfunction    Abnormal CBC    Anemia    Anxiety disorder    Asperger's disorder    Chronic back pain    Closed nondisplaced oblique fracture of shaft of ulna with routine healing    Major depression    Dysphagia    Abdominal pain, periumbilical    Epigastric abdominal pain    Fatigue    Grand mal seizure (Multi)    Headache    Heart murmur    Hemangioma of skin and subcutaneous tissue    Hematuria    Hot flashes, menopausal    Humerus fracture    Hyperlipidemia    Increased frequency of urination    Chronic constipation    Irritable bowel syndrome with constipation    Irritable bowel syndrome with diarrhea    Malnutrition (Multi)    Nevus, non-neoplastic    Osteoporosis    Other melanin hyperpigmentation    Other seborrheic keratosis    Pancreatic cyst (HHS-HCC)    Restless leg syndrome    Shoulder pain, left    Closed traumatic displaced fracture of distal end of radius with routine healing, subsequent encounter    Tubular adenoma of colon    Abdominal mass, LUQ (left upper quadrant)    Uterine mass    Vaginal atrophy    Vitamin D deficiency    Burst fracture of T12 vertebra (Multi)    Distal radius fracture, left    Left wrist pain    Odynophagia    Other intraarticular fracture of lower end of left radius,  subsequent encounter for closed fracture with routine healing    Periprosthetic hip fracture, subsequent encounter    AIYANA (generalized anxiety disorder)    OCD (obsessive compulsive disorder)       Follow-up plan for depression was discussed with patient    Review with patient: Treatment plan reviewed with the patient.  Medication risks/benefit reviewed with the patient  Psychiatric Risk Assessment  Violence Risk Assessment: none  Acute Risk of Harm to Others is Considered: low   Suicide Risk Assessment: age > 65 yrs old and   Protective Factors against Suicide: adherence to  treatment, moral objections to suicide, and social support/connectedness  Acute Risk of Harm to Self is Considered: low    Rukhsana Kim, DO

## 2024-11-11 DIAGNOSIS — G40.409 GRAND MAL SEIZURE (MULTI): ICD-10-CM

## 2024-11-11 RX ORDER — CARBAMAZEPINE 300 MG/1
CAPSULE, EXTENDED RELEASE ORAL
Qty: 270 CAPSULE | Refills: 10 | Status: SHIPPED | OUTPATIENT
Start: 2024-11-11

## 2025-01-06 DIAGNOSIS — F33.40 RECURRENT MAJOR DEPRESSIVE DISORDER, IN REMISSION (CMS-HCC): ICD-10-CM

## 2025-01-06 RX ORDER — RISPERIDONE 0.25 MG/1
0.75 TABLET ORAL NIGHTLY
Qty: 270 TABLET | Refills: 0 | Status: SHIPPED | OUTPATIENT
Start: 2025-01-06 | End: 2025-04-06

## 2025-01-13 DIAGNOSIS — F33.9 RECURRENT MAJOR DEPRESSIVE DISORDER, REMISSION STATUS UNSPECIFIED (CMS-HCC): ICD-10-CM

## 2025-01-13 DIAGNOSIS — F41.1 GAD (GENERALIZED ANXIETY DISORDER): ICD-10-CM

## 2025-01-13 RX ORDER — CITALOPRAM 20 MG/1
20 TABLET, FILM COATED ORAL DAILY
Qty: 90 TABLET | Refills: 0 | Status: SHIPPED | OUTPATIENT
Start: 2025-01-13 | End: 2025-04-13

## 2025-02-03 ENCOUNTER — APPOINTMENT (OUTPATIENT)
Dept: BEHAVIORAL HEALTH | Facility: CLINIC | Age: 70
End: 2025-02-03
Payer: MEDICARE

## 2025-02-03 DIAGNOSIS — F41.1 GAD (GENERALIZED ANXIETY DISORDER): ICD-10-CM

## 2025-02-03 PROCEDURE — 99214 OFFICE O/P EST MOD 30 MIN: CPT | Performed by: PSYCHIATRY & NEUROLOGY

## 2025-02-03 PROCEDURE — 1036F TOBACCO NON-USER: CPT | Performed by: PSYCHIATRY & NEUROLOGY

## 2025-02-03 PROCEDURE — 1160F RVW MEDS BY RX/DR IN RCRD: CPT | Performed by: PSYCHIATRY & NEUROLOGY

## 2025-02-03 PROCEDURE — 1159F MED LIST DOCD IN RCRD: CPT | Performed by: PSYCHIATRY & NEUROLOGY

## 2025-02-03 RX ORDER — MIRTAZAPINE 30 MG/1
30 TABLET, FILM COATED ORAL NIGHTLY
Qty: 90 TABLET | Refills: 0 | Status: SHIPPED | OUTPATIENT
Start: 2025-02-03 | End: 2025-05-04

## 2025-02-03 NOTE — PROGRESS NOTES
"Outpatient Psychiatry          Reason for Visit: follow up for PDD, mild-mod; OCD; AIYANA; r/o cog disorder .    Going to more activities, coloring, TV, word games.   -Joselaurence Torsten present for visit, She reports everything has been going well for Marshall. Marshall was even able to go shower at [employer] when her water was out.       HPI: 70 y/o single female who is being treated for depression . Marshall reports that her mood has been ok. \"Trying to keep things cool\".     She is tolerating remeron and Risperdal and no SE.    There is still chronic worry, but less distressed. Less counting/rituals.   There is still some degree of preoccupation with physical concerns. No longer calls Raul and her brother repeatedly.   -no new health issues    Continues to be  more active, going on walks, more social and engaging more in activities.  Does word trivia and word games. Continues in balance class .    No falls. Appetite and sleep ok. he would like to gain some weight.  Recent weight about 80 lbs. She feels uncomfortable if she eats more.  Discussed trying to eat more and divert self from somatic preoccupation after eating.     There is no death wish or SI, hallucinations, paranoia, alfonso, wants things to be perfect and neat , counts to 50, has to check things many times, takes pills in and out of pill box.    she does not want to go to a nursing home.  5/2022 at ER as she she had inadvertently OD on tegretol, 4/24/2022 she fell and bumper her head which resulted in an ER visit.    PAST PSYCH H/O: last saw a psychiatrist over 2010 saw an NP in May 2021 X 1 and was prescribed abilify which she did not take   PAST PSYCH HOSP: none   PAST SELF HARM: none   past psych meds: abilify, celexa (since 2013) , trazadone , gabapentin , elavil , buspar , risperdal     DANI: Denies all substance abuse   FAMILY PSYCH H/O: brother - schizophrenia , alcohol abuse     reviewed past med h/o, meds, allergies    SOCIAL H/O: parents were optometrists, " one living brother -, one  brother. 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 Never , no children. She has never been able to be fully independent - unclear what the etiology is.        Current Medications:    Current Outpatient Medications:     acetaminophen (Tylenol) 325 mg tablet, Take 2 tablets (650 mg) by mouth every 4 hours if needed., Disp: , Rfl:     carBAMazepine (Carbatrol) 300 mg 12 hr capsule, TAKE 1 CAPSULE (300 MG) BY MOUTH 3 TIMES A DAY. NEEDS APPOINTMENT. CAN SEE TOMASZ RODRIGUEZ NP ([phone]), Disp: 270 capsule, Rfl: 10    citalopram (CeleXA) 20 mg tablet, Take 1 tablet (20 mg) by mouth once daily., Disp: 90 tablet, Rfl: 0    mirtazapine (Remeron) 30 mg tablet, Take 1 tablet (30 mg) by mouth once daily at bedtime., Disp: 90 tablet, Rfl: 0    multivitamin tablet, Take 1 tablet by mouth once daily., Disp: , Rfl:     omega-3 fatty acids-fish oil (Fish OiL) 340-1,000 mg capsule, Take 1 capsule by mouth once daily., Disp: , Rfl:     oxybutynin XL (Ditropan-XL) 10 mg 24 hr tablet, Take 1 tablet (10 mg) by mouth once daily., Disp: , Rfl:     risperiDONE (RisperDAL) 0.25 mg tablet, Take 3 tablets (0.75 mg) by mouth once daily at bedtime., Disp: 270 tablet, Rfl: 0    simvastatin (Zocor) 20 mg tablet, Take 1 tablet (20 mg) by mouth once daily., Disp: , Rfl:   Medical History:  Past Medical History:   Diagnosis Date    Asymptomatic menopausal state     Menopause    Epilepsy, unspecified, not intractable, without status epilepticus 2022    Epilepsy    Fracture of right shoulder girdle, part unspecified, initial encounter for closed fracture     Fracture of right shoulder     Surgical History:  Past Surgical History:   Procedure Laterality Date    KNEE ARTHROSCOPY W/ DEBRIDEMENT  2014    Arthroscopy Knee    MR HEAD ANGIO WO IV CONTRAST  12/15/2022    MR HEAD ANGIO WO IV CONTRAST 12/15/2022  [facility]    MR NECK ANGIO WO IV CONTRAST  12/15/2022    MR NECK ANGIO WO IV CONTRAST 12/15/2022 [facility]    TONSILLECTOMY  02/13/2014    Tonsillectomy With Adenoidectomy     Family History:  Family History   Problem Relation Name Age of Onset    Hyperlipidemia Mother      Hypertension Mother      Irritable bowel syndrome Mother       Social History:  Social History     Socioeconomic History    Marital status: Single     Spouse name: Not on file    Number of children: Not on file    Years of education: Not on file    Highest education level: Not on file   Occupational History    Not on file   Tobacco Use    Smoking status: Never    Smokeless tobacco: Never   Substance and Sexual Activity    Alcohol use: Not on file    Drug use: Not on file    Sexual activity: Not on file   Other Topics Concern    Not on file   Social History Narrative    Not on file     Social Drivers of Health     Financial Resource Strain: Not on file   Food Insecurity: Not on file   Transportation Needs: Not on file   Physical Activity: Not on file   Stress: Not on file   Social Connections: Not on file   Intimate Partner Violence: Not on file   Housing Stability: Not on file       Medical Review Of Systems:  Pertinent items are noted in HPI.    Psychiatric Review Of Systems:  Psychiatric ROS - Adult  Anxiety: General Anxiety Disorder (AIYANA)AIYANA Behaviors: difficult to control worry, excessive anxiety/worry, difficulty concentrating, and irritability  Depression: most of the time mood is ok, able to enjoy things   Delirium: negative  Psychosis: negative  Codi: negative  Safety Issues: none  Psychiatric ROS Comment: less persistent rumination/worry  Disordered Eating Symptoms:n/a   Inattentive Symptoms: n/a    Hyperactive/Impulsive Symptoms:n/a     Trauma Symptoms:n/a               11/7/2022    11:45 AM 12/14/2022     7:31 PM 12/15/2022     6:54 PM 1/17/2023    10:42 AM 3/23/2023     3:40 PM 8/27/2024    11:22 AM 9/9/2024      "2:56 PM   Vitals   Systolic 125 107  121  99    Diastolic 66 50  60  62    BP Location      Right arm    Heart Rate 68 58  69 69 78 78   Temp  36.3 °C (97.3 °F)        Resp 16 16  16  18    Height 1.626 m (5' 4\") 1.549 m (5' 0.98\") 1.548 m (5' 0.95\") 1.626 m (5' 4\") 1.626 m (5' 4\")  1.626 m (5' 4\")   Weight (lb) 84 108.03 108.03 85 82.4 80 82   BMI 14.42 kg/m2 20.42 kg/m2 20.45 kg/m2 14.59 kg/m2 14.14 kg/m2 13.73 kg/m2 14.08 kg/m2   BSA (m2) 1.31 m2 1.45 m2 1.45 m2 1.32 m2 1.3 m2 1.28 m2 1.3 m2   Visit Report      Report Report         Chemistry              Objective     Mental Status Examination  General Appearance: Well groomed, appropriate eye contact.  Gait/Station: n/a  Speech: Normal rate, volume, prosody, some stuttering   Mood: fine  Affect: pleasant, full range   Thought Process: Linear, goal directed  Thought Associations:- intact   Thought Content: normal  Perception: No perceptual abnormalities noted  Level of Consciousness: Alert  Orientation: Alert and oriented to person, place, time and situation  Attention and Concentration: Intact  Recent Memory: fair   Remote Memory: caregiver helps with historical details   Executive function: needs assistance with IADLs   Language: no clear deficits, at baseline   Fund of knowledge: fair   Insight :fair   Judgment: fair         Assessment/Plan   70 y/o single female who presented 7/19/2021 with a long h/o excessive worry and there are associated somatic symptoms , OCD symptoms, depression, fatigue. She is disabled due to functional impairment - not able to be fully independent (? cognitive disorder, autistic spectrum ? ). Chronic complaints include anxiety/depression, fatigue, poor motivation. Addition of remeron had been partially helpful with mood and anxiety and with addition of risperdal there has been significant mood improvement and better control of anxiety.  No treatment changes at this time. No med SE, although she admits to dry mouth,  no involuntary " abnormal movements. There is no psychosis, death wish, SI, HI. She lives in [facility] and her friend Raul is very helpful.      She in on tegretol for seizures.  She is a poor historian and her friend clarifies historical details.      dx: PDD, mild-mod; OCD; AIYANA; r/o cog disorder      1) continue remeron 30 mg at night , citalopram 20 mg ,  risperdal 0.75 mg at night, reviewed r/b/a including metabolic monitoring and TD risk , patient gives IC   2) consider neuropsych testing to characterize cognitive difficulties   3) available labs reviewed, lipids and blood sugar completed 7/2024, vit d will be stopped due to elevated levels   4) f/u 8 weeks        50604-   Patient Active Problem List   Diagnosis    Physical deconditioning    Ambulatory dysfunction    Abnormal CBC    Anemia    Anxiety disorder    Asperger's disorder    Chronic back pain    Closed nondisplaced oblique fracture of shaft of ulna with routine healing    Major depression    Dysphagia    Abdominal pain, periumbilical    Epigastric abdominal pain    Fatigue    Grand mal seizure (Multi)    Headache    Heart murmur    Hemangioma of skin and subcutaneous tissue    Hematuria    Hot flashes, menopausal    Humerus fracture    Hyperlipidemia    Increased frequency of urination    Chronic constipation    Irritable bowel syndrome with constipation    Irritable bowel syndrome with diarrhea    Malnutrition (Multi)    Nevus, non-neoplastic    Osteoporosis    Other melanin hyperpigmentation    Other seborrheic keratosis    Pancreatic cyst (HHS-HCC)    Restless leg syndrome    Shoulder pain, left    Closed traumatic displaced fracture of distal end of radius with routine healing, subsequent encounter    Tubular adenoma of colon    Abdominal mass, LUQ (left upper quadrant)    Uterine mass    Vaginal atrophy    Vitamin D deficiency    Burst fracture of T12 vertebra (Multi)    Distal radius fracture, left    Left wrist pain    Odynophagia    Other intraarticular fracture of  lower end of left radius, subsequent encounter for closed fracture with routine healing    Periprosthetic hip fracture, subsequent encounter    AIYANA (generalized anxiety disorder)    OCD (obsessive compulsive disorder)       Follow-up plan for depression was discussed with patient    Review with patient: Treatment plan reviewed with the patient.  Medication risks/benefit reviewed with the patient  Psychiatric Risk Assessment  Violence Risk Assessment: none  Acute Risk of Harm to Others is Considered: low   Suicide Risk Assessment: age > 65 yrs old and   Protective Factors against Suicide: adherence to  treatment, moral objections to suicide, and social support/connectedness  Acute Risk of Harm to Self is Considered: low    Rukhsana Kim, DO

## 2025-03-31 ENCOUNTER — APPOINTMENT (OUTPATIENT)
Dept: BEHAVIORAL HEALTH | Facility: CLINIC | Age: 70
End: 2025-03-31
Payer: MEDICARE

## 2025-03-31 DIAGNOSIS — F42.9 OBSESSIVE-COMPULSIVE DISORDER, UNSPECIFIED TYPE: ICD-10-CM

## 2025-03-31 DIAGNOSIS — F33.40 RECURRENT MAJOR DEPRESSIVE DISORDER, IN REMISSION (CMS-HCC): ICD-10-CM

## 2025-03-31 DIAGNOSIS — F41.1 GAD (GENERALIZED ANXIETY DISORDER): ICD-10-CM

## 2025-03-31 DIAGNOSIS — F33.9 RECURRENT MAJOR DEPRESSIVE DISORDER, REMISSION STATUS UNSPECIFIED: ICD-10-CM

## 2025-03-31 PROCEDURE — 99214 OFFICE O/P EST MOD 30 MIN: CPT | Performed by: PSYCHIATRY & NEUROLOGY

## 2025-03-31 PROCEDURE — 1159F MED LIST DOCD IN RCRD: CPT | Performed by: PSYCHIATRY & NEUROLOGY

## 2025-03-31 PROCEDURE — 1160F RVW MEDS BY RX/DR IN RCRD: CPT | Performed by: PSYCHIATRY & NEUROLOGY

## 2025-03-31 PROCEDURE — 1036F TOBACCO NON-USER: CPT | Performed by: PSYCHIATRY & NEUROLOGY

## 2025-03-31 RX ORDER — RISPERIDONE 0.25 MG/1
0.75 TABLET ORAL NIGHTLY
Qty: 270 TABLET | Refills: 0 | Status: SHIPPED | OUTPATIENT
Start: 2025-03-31 | End: 2025-06-29

## 2025-03-31 RX ORDER — CITALOPRAM 20 MG/1
20 TABLET, FILM COATED ORAL DAILY
Qty: 90 TABLET | Refills: 0 | Status: SHIPPED | OUTPATIENT
Start: 2025-03-31 | End: 2025-06-29

## 2025-03-31 NOTE — PROGRESS NOTES
Outpatient Psychiatry          Reason for Visit: follow up for PDD, mild-mod; OCD; AIYANA; r/o cog disorder .      -Raul Sarmiento present for visit, She reports everything has been going well for Marshall.    HPI: 70 y/o single female who is being treated for depression . Marshall reports that her mood seems to be better overall.     She is tolerating remeron and Risperdal and no SE.    There is still chronic worry, but less distressed. Less counting/rituals.     -no new health issues    Continues to be  more active, going on walks, more social and engaging more in activities.  Does word trivia and word games. Continues in balance class .    No falls. Appetite and sleep ok. he would like to gain some weight.  Recent weight about 80 lbs. She feels uncomfortable if she eats more.  Discussed trying to eat more and divert self from somatic preoccupation after eating.     There is no death wish or SI, hallucinations, paranoia, alfonso, wants things to be perfect and neat , counts to 50, has to check things many times, takes pills in and out of pill box.    she does not want to go to a nursing home.  2022 at ER as she she had inadvertently OD on tegretol, 2022 she fell and bumper her head which resulted in an ER visit.    PAST PSYCH H/O: last saw a psychiatrist over  saw an NP in May 2021 X 1 and was prescribed abilify which she did not take   PAST PSYCH HOSP: none   PAST SELF HARM: none   past psych meds: abilify, celexa (since ) , trazadone , gabapentin , elavil , buspar , risperdal     DANI: Denies all substance abuse   FAMILY PSYCH H/O: brother - schizophrenia , alcohol abuse     reviewed past med h/o, meds, allergies    SOCIAL H/O: parents were optometrists, one living brother -, one  brother. 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 Never , no children. She has never been able to be fully independent -  unclear what the etiology is.        Current Medications:    Current Outpatient Medications:     acetaminophen (Tylenol) 325 mg tablet, Take 2 tablets (650 mg) by mouth every 4 hours if needed., Disp: , Rfl:     carBAMazepine (Carbatrol) 300 mg 12 hr capsule, TAKE 1 CAPSULE (300 MG) BY MOUTH 3 TIMES A DAY. NEEDS APPOINTMENT. CAN SEE TOMASZ RODRIGUEZ NP ([phone]), Disp: 270 capsule, Rfl: 10    citalopram (CeleXA) 20 mg tablet, Take 1 tablet (20 mg) by mouth once daily., Disp: 90 tablet, Rfl: 0    mirtazapine (Remeron) 30 mg tablet, Take 1 tablet (30 mg) by mouth once daily at bedtime., Disp: 90 tablet, Rfl: 0    multivitamin tablet, Take 1 tablet by mouth once daily., Disp: , Rfl:     omega-3 fatty acids-fish oil (Fish OiL) 340-1,000 mg capsule, Take 1 capsule by mouth once daily., Disp: , Rfl:     oxybutynin XL (Ditropan-XL) 10 mg 24 hr tablet, Take 1 tablet (10 mg) by mouth once daily., Disp: , Rfl:     risperiDONE (RisperDAL) 0.25 mg tablet, Take 3 tablets (0.75 mg) by mouth once daily at bedtime., Disp: 270 tablet, Rfl: 0    simvastatin (Zocor) 20 mg tablet, Take 1 tablet (20 mg) by mouth once daily., Disp: , Rfl:   Medical History:  Past Medical History:   Diagnosis Date    Asymptomatic menopausal state     Menopause    Epilepsy, unspecified, not intractable, without status epilepticus 11/07/2022    Epilepsy    Fracture of right shoulder girdle, part unspecified, initial encounter for closed fracture     Fracture of right shoulder     Surgical History:  Past Surgical History:   Procedure Laterality Date    KNEE ARTHROSCOPY W/ DEBRIDEMENT  02/13/2014    Arthroscopy Knee    MR HEAD ANGIO WO IV CONTRAST  12/15/2022    MR HEAD ANGIO WO IV CONTRAST 12/15/2022 [facility]    MR NECK ANGIO WO IV CONTRAST  12/15/2022    MR NECK ANGIO WO IV CONTRAST 12/15/2022 [facility]    TONSILLECTOMY  02/13/2014    Tonsillectomy With Adenoidectomy     Family History:  Family History   Problem Relation Name Age  "of Onset    Hyperlipidemia Mother      Hypertension Mother      Irritable bowel syndrome Mother       Social History:  Social History     Socioeconomic History    Marital status: Single     Spouse name: Not on file    Number of children: Not on file    Years of education: Not on file    Highest education level: Not on file   Occupational History    Not on file   Tobacco Use    Smoking status: Never    Smokeless tobacco: Never   Substance and Sexual Activity    Alcohol use: Not on file    Drug use: Not on file    Sexual activity: Not on file   Other Topics Concern    Not on file   Social History Narrative    Not on file     Social Drivers of Health     Financial Resource Strain: Not on file   Food Insecurity: Not on file   Transportation Needs: Not on file   Physical Activity: Not on file   Stress: Not on file   Social Connections: Not on file   Intimate Partner Violence: Not on file   Housing Stability: Not on file       Medical Review Of Systems:  Pertinent items are noted in HPI.    Psychiatric Review Of Systems:  Psychiatric ROS - Adult  Anxiety: General Anxiety Disorder (AIYANA)AIYANA Behaviors: difficult to control worry, excessive anxiety/worry, difficulty concentrating, and irritability  Depression: most of the time mood is ok, able to enjoy things   Delirium: negative  Psychosis: negative  Codi: negative  Safety Issues: none  Psychiatric ROS Comment: less persistent rumination/worry  Disordered Eating Symptoms:n/a   Inattentive Symptoms: n/a    Hyperactive/Impulsive Symptoms:n/a     Trauma Symptoms:n/a               11/7/2022    11:45 AM 12/14/2022     7:31 PM 12/15/2022     6:54 PM 1/17/2023    10:42 AM 3/23/2023     3:40 PM 8/27/2024    11:22 AM 9/9/2024     2:56 PM   Vitals   Systolic 125 107  121  99    Diastolic 66 50  60  62    BP Location      Right arm    Heart Rate 68 58  69 69 78 78   Temp  36.3 °C (97.3 °F)        Resp 16 16  16  18    Height 1.626 m (5' 4\") 1.549 m (5' 0.98\") 1.548 m (5' 0.95\") 1.626 " "m (5' 4\") 1.626 m (5' 4\")  1.626 m (5' 4\")   Weight (lb) 84 108.03 108.03 85 82.4 80 82   BMI 14.42 kg/m2 20.42 kg/m2 20.45 kg/m2 14.59 kg/m2 14.14 kg/m2 13.73 kg/m2 14.08 kg/m2   BSA (m2) 1.31 m2 1.45 m2 1.45 m2 1.32 m2 1.3 m2 1.28 m2 1.3 m2   Visit Report      Report Report         Chemistry              Objective     Mental Status Examination  General Appearance: Well groomed, appropriate eye contact.  Gait/Station: n/a  Speech: Normal rate, volume, prosody, some stuttering   Mood: fine  Affect: pleasant, full range   Thought Process: Linear, goal directed  Thought Associations:- intact   Thought Content: normal  Perception: No perceptual abnormalities noted  Level of Consciousness: Alert  Orientation: Alert and oriented to person, place, time and situation  Attention and Concentration: Intact  Recent Memory: fair   Remote Memory: caregiver helps with historical details   Executive function: needs assistance with IADLs   Language: no clear deficits, at baseline   Fund of knowledge: fair   Insight :fair   Judgment: fair         Assessment/Plan   68 y/o single female who presented 7/19/2021 with a long h/o excessive worry and there are associated somatic symptoms , OCD symptoms, depression, fatigue. She is disabled due to functional impairment - not able to be fully independent (? cognitive disorder, autistic spectrum ? ). Chronic complaints include anxiety/depression, fatigue, poor motivation. Addition of remeron had been partially helpful with mood and anxiety and with addition of risperdal there has been significant mood improvement and better control of anxiety.  No treatment changes at this time. No med SE, although she admits to dry mouth,  no involuntary abnormal movements. There is no psychosis, death wish, SI, HI. She lives in \[facility]"" and her friend Raul is very helpful.      She in on tegretol for seizures.  She is a poor historian and her friend clarifies historical details.      dx: PDD, mild-mod; OCD; " AIYANA; r/o cog disorder      1) continue remeron 30 mg at night , citalopram 20 mg ,  risperdal 0.75 mg at night, reviewed r/b/a including metabolic monitoring and TD risk , patient gives IC   2) consider neuropsych testing to characterize cognitive difficulties   3) available labs reviewed, lipids and blood sugar completed 7/2024, vit d will be stopped due to elevated levels   4) f/u 8 weeks        40110-   Patient Active Problem List   Diagnosis    Physical deconditioning    Ambulatory dysfunction    Abnormal CBC    Anemia    Anxiety disorder    Asperger's disorder    Chronic back pain    Closed nondisplaced oblique fracture of shaft of ulna with routine healing    Major depression    Dysphagia    Abdominal pain, periumbilical    Epigastric abdominal pain    Fatigue    Grand mal seizure (Multi)    Headache    Heart murmur    Hemangioma of skin and subcutaneous tissue    Hematuria    Hot flashes, menopausal    Humerus fracture    Hyperlipidemia    Increased frequency of urination    Chronic constipation    Irritable bowel syndrome with constipation    Irritable bowel syndrome with diarrhea    Malnutrition (Multi)    Nevus, non-neoplastic    Osteoporosis    Other melanin hyperpigmentation    Other seborrheic keratosis    Pancreatic cyst (HHS-HCC)    Restless leg syndrome    Shoulder pain, left    Closed traumatic displaced fracture of distal end of radius with routine healing, subsequent encounter    Tubular adenoma of colon    Abdominal mass, LUQ (left upper quadrant)    Uterine mass    Vaginal atrophy    Vitamin D deficiency    Burst fracture of T12 vertebra (Multi)    Distal radius fracture, left    Left wrist pain    Odynophagia    Other intraarticular fracture of lower end of left radius, subsequent encounter for closed fracture with routine healing    Periprosthetic hip fracture, subsequent encounter    AIYANA (generalized anxiety disorder)    OCD (obsessive compulsive disorder)       Follow-up plan for depression  was discussed with patient    Review with patient: Treatment plan reviewed with the patient.  Medication risks/benefit reviewed with the patient  Psychiatric Risk Assessment  Violence Risk Assessment: none  Acute Risk of Harm to Others is Considered: low   Suicide Risk Assessment: age > 65 yrs old and   Protective Factors against Suicide: adherence to  treatment, moral objections to suicide, and social support/connectedness  Acute Risk of Harm to Self is Considered: low    Rukhsana Kim, DO

## 2025-03-31 NOTE — PATIENT INSTRUCTIONS
If there are any thoughts of harming your self, harming others, concerning worsening of your mental health symptoms or concerning medication side effects, please call [phone] or reports to the nearest emergency room.     Contact via [employer] or call sooner ([phone]) in case of any questions or concerns.

## 2025-06-09 ENCOUNTER — APPOINTMENT (OUTPATIENT)
Dept: BEHAVIORAL HEALTH | Facility: CLINIC | Age: 70
End: 2025-06-09
Payer: MEDICARE

## 2025-06-23 ENCOUNTER — APPOINTMENT (OUTPATIENT)
Dept: BEHAVIORAL HEALTH | Facility: CLINIC | Age: 70
End: 2025-06-23
Payer: MEDICARE

## 2025-06-23 DIAGNOSIS — F33.9 RECURRENT MAJOR DEPRESSIVE DISORDER, REMISSION STATUS UNSPECIFIED: ICD-10-CM

## 2025-06-23 DIAGNOSIS — F41.1 GAD (GENERALIZED ANXIETY DISORDER): ICD-10-CM

## 2025-06-23 DIAGNOSIS — F42.9 OBSESSIVE-COMPULSIVE DISORDER, UNSPECIFIED TYPE: ICD-10-CM

## 2025-06-23 DIAGNOSIS — F33.40 RECURRENT MAJOR DEPRESSIVE DISORDER, IN REMISSION: ICD-10-CM

## 2025-06-23 PROCEDURE — 90833 PSYTX W PT W E/M 30 MIN: CPT | Performed by: PSYCHIATRY & NEUROLOGY

## 2025-06-23 PROCEDURE — 1160F RVW MEDS BY RX/DR IN RCRD: CPT | Performed by: PSYCHIATRY & NEUROLOGY

## 2025-06-23 PROCEDURE — 99214 OFFICE O/P EST MOD 30 MIN: CPT | Performed by: PSYCHIATRY & NEUROLOGY

## 2025-06-23 PROCEDURE — 1159F MED LIST DOCD IN RCRD: CPT | Performed by: PSYCHIATRY & NEUROLOGY

## 2025-06-23 PROCEDURE — 1036F TOBACCO NON-USER: CPT | Performed by: PSYCHIATRY & NEUROLOGY

## 2025-06-23 RX ORDER — RISPERIDONE 0.25 MG/1
0.75 TABLET ORAL NIGHTLY
Qty: 270 TABLET | Refills: 0 | Status: SHIPPED | OUTPATIENT
Start: 2025-06-23 | End: 2025-09-21

## 2025-06-23 RX ORDER — CITALOPRAM 20 MG/1
20 TABLET ORAL DAILY
Qty: 90 TABLET | Refills: 0 | Status: SHIPPED | OUTPATIENT
Start: 2025-06-23 | End: 2025-09-21

## 2025-06-23 NOTE — PROGRESS NOTES
Outpatient Psychiatry          Reason for Visit: follow up for PDD, mild-mod; OCD; AIYANA; r/o cog disorder .      -Raul Sarmiento present for visit, She reports everything has been going well for Marshall.    HPI: 68 y/o single female who is being treated for depression . Marshall continues to be stable regarding mood and anxiety. Puts effort into staying positive. Says prayers in the am.    She is tolerating remeron and Risperdal and no SE.    There is still chronic worry, but less distressed. Less counting/rituals.     -no new health issues    Continues to be  more active, going on walks, more social and engaging more in activities.  Does word trivia and word games. Continues in balance class .    No falls. Appetite and sleep ok.   Recent weight about 84 lbs., the most she has ever weighted is 98 lbs She feels uncomfortable if she eats more.  Discussed trying to eat more and divert self from somatic preoccupation after eating.     There is no death wish or SI, hallucinations, paranoia, alfonso, wants things to be perfect and neat , counts to 50, has to check things many times, takes pills in and out of pill box.    she does not want to go to a nursing home.  2022 at ER as she she had inadvertently OD on tegretol, 2022 she fell and bumper her head which resulted in an ER visit.    PAST PSYCH H/O: last saw a psychiatrist over  saw an NP in May 2021 X 1 and was prescribed abilify which she did not take   PAST PSYCH HOSP: none   PAST SELF HARM: none   past psych meds: abilify, celexa (since ) , trazadone , gabapentin , elavil , buspar , risperdal     DANI: Denies all substance abuse   FAMILY PSYCH H/O: brother - schizophrenia , alcohol abuse     reviewed past med h/o, meds, allergies    SOCIAL H/O: parents were optometrists, one living brother -, one  brother. 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 Never , no  children. She has never been able to be fully independent - unclear what the etiology is.        Current Medications:    Current Outpatient Medications:     acetaminophen (Tylenol) 325 mg tablet, Take 2 tablets (650 mg) by mouth every 4 hours if needed., Disp: , Rfl:     carBAMazepine (Carbatrol) 300 mg 12 hr capsule, TAKE 1 CAPSULE (300 MG) BY MOUTH 3 TIMES A DAY. NEEDS APPOINTMENT. CAN SEE TOMASZ RODRIGUEZ NP ([phone]), Disp: 270 capsule, Rfl: 10    citalopram (CeleXA) 20 mg tablet, Take 1 tablet (20 mg) by mouth once daily., Disp: 90 tablet, Rfl: 0    mirtazapine (Remeron) 30 mg tablet, GIVE 1 TABLET BY MOUTH  DAILY AT BEDTIME, Disp: 90 tablet, Rfl: 0    multivitamin tablet, Take 1 tablet by mouth once daily., Disp: , Rfl:     omega-3 fatty acids-fish oil (Fish OiL) 340-1,000 mg capsule, Take 1 capsule by mouth once daily., Disp: , Rfl:     oxybutynin XL (Ditropan-XL) 10 mg 24 hr tablet, Take 1 tablet (10 mg) by mouth once daily., Disp: , Rfl:     risperiDONE (RisperDAL) 0.25 mg tablet, Take 3 tablets (0.75 mg) by mouth once daily at bedtime., Disp: 270 tablet, Rfl: 0    simvastatin (Zocor) 20 mg tablet, Take 1 tablet (20 mg) by mouth once daily., Disp: , Rfl:   Medical History:  Past Medical History:   Diagnosis Date    Asymptomatic menopausal state     Menopause    Epilepsy, unspecified, not intractable, without status epilepticus 11/07/2022    Epilepsy    Fracture of right shoulder girdle, part unspecified, initial encounter for closed fracture     Fracture of right shoulder     Surgical History:  Past Surgical History:   Procedure Laterality Date    KNEE ARTHROSCOPY W/ DEBRIDEMENT  02/13/2014    Arthroscopy Knee    MR HEAD ANGIO WO IV CONTRAST  12/15/2022    MR HEAD ANGIO WO IV CONTRAST 12/15/2022 [facility]    MR NECK ANGIO WO IV CONTRAST  12/15/2022    MR NECK ANGIO WO IV CONTRAST 12/15/2022 [facility]    TONSILLECTOMY  02/13/2014    Tonsillectomy With Adenoidectomy     Family  History:  Family History   Problem Relation Name Age of Onset    Hyperlipidemia Mother      Hypertension Mother      Irritable bowel syndrome Mother       Social History:  Social History     Socioeconomic History    Marital status: Single     Spouse name: Not on file    Number of children: Not on file    Years of education: Not on file    Highest education level: Not on file   Occupational History    Not on file   Tobacco Use    Smoking status: Never    Smokeless tobacco: Never   Substance and Sexual Activity    Alcohol use: Not on file    Drug use: Not on file    Sexual activity: Not on file   Other Topics Concern    Not on file   Social History Narrative    Not on file     Social Drivers of Health     Financial Resource Strain: Not on file   Food Insecurity: Not on file   Transportation Needs: Not on file   Physical Activity: Not on file   Stress: Not on file   Social Connections: Not on file   Intimate Partner Violence: Not on file   Housing Stability: Not on file       Medical Review Of Systems:  Pertinent items are noted in HPI.    Psychiatric Review Of Systems:  Psychiatric ROS - Adult  Anxiety: General Anxiety Disorder (AIYANA)AIYANA Behaviors: difficult to control worry, excessive anxiety/worry, difficulty concentrating, and irritability  Depression: most of the time mood is ok, able to enjoy things   Delirium: negative  Psychosis: negative  Codi: negative  Safety Issues: none  Psychiatric ROS Comment: less persistent rumination/worry  Disordered Eating Symptoms:n/a   Inattentive Symptoms: n/a    Hyperactive/Impulsive Symptoms:n/a     Trauma Symptoms:n/a          Objective     Mental Status Examination  General Appearance: Well groomed, appropriate eye contact.  Gait/Station: n/a  Speech: Normal rate, volume, prosody, some stuttering   Mood: fine  Affect: pleasant, full range   Thought Process: Linear, goal directed  Thought Associations:- intact   Thought Content: normal  Perception: No perceptual abnormalities  noted  Level of Consciousness: Alert  Orientation: Alert and oriented to person, place, time and situation  Attention and Concentration: Intact  Recent Memory: fair   Remote Memory: caregiver helps with historical details   Executive function: needs assistance with IADLs   Language: no clear deficits, at baseline   Fund of knowledge: fair   Insight :fair   Judgment: fair           Assessment/Plan   68 y/o single female who presented 7/19/2021 with a long h/o excessive worry and there are associated somatic symptoms , OCD symptoms, depression, fatigue. She is disabled due to functional impairment - not able to be fully independent (? cognitive disorder, autistic spectrum ? ). Chronic complaints include anxiety/depression, fatigue, poor motivation. Addition of remeron had been partially helpful with mood and anxiety and with addition of risperdal there has been significant mood improvement and better control of anxiety.  No treatment changes at this time. No med SE, although she admits to dry mouth,  no involuntary abnormal movements. There is no psychosis, death wish, SI, HI. She lives in [facility] and her friend Raul is very helpful.      She in on tegretol for seizures.  She is a poor historian and her friend clarifies historical details.      dx: PDD, mild-mod; OCD; AIYANA; r/o cog disorder      1) continue remeron 30 mg at night , citalopram 20 mg ,  risperdal 0.75 mg at night, reviewed r/b/a including metabolic monitoring and TD risk , patient gives IC   2) consider neuropsych testing to characterize cognitive difficulties   3) available labs reviewed, lipids and blood sugar completed 7/2024  4) f/u 8 -12 weeks        90678- 16 minutes - supportive counseling and problems solving counseling provided - Marshall is using positive affirmations to stay positive. She says prayers in the am,. Suggest she use positive self talk throughout the day to remain positive. Encouraged to continue engagement in activities. We talked about  overheating, suggest she see gyn regarding hormone consideration. Reviewed that mental health medications make overheating more likely.   Her brother is coming to visit which is making her nervous, fears something will go wrong. She has not seen him for 8 years. Talks to him daily.   Patient Active Problem List   Diagnosis    Physical deconditioning    Ambulatory dysfunction    Abnormal CBC    Anemia    Anxiety disorder    Asperger's disorder    Chronic back pain    Closed nondisplaced oblique fracture of shaft of ulna with routine healing    Major depression    Dysphagia    Abdominal pain, periumbilical    Epigastric abdominal pain    Fatigue    Grand mal seizure (Multi)    Headache    Heart murmur    Hemangioma of skin and subcutaneous tissue    Hematuria    Hot flashes, menopausal    Humerus fracture    Hyperlipidemia    Increased frequency of urination    Chronic constipation    Irritable bowel syndrome with constipation    Irritable bowel syndrome with diarrhea    Malnutrition (Multi)    Nevus, non-neoplastic    Osteoporosis    Other melanin hyperpigmentation    Other seborrheic keratosis    Pancreatic cyst (HHS-HCC)    Restless leg syndrome    Shoulder pain, left    Closed traumatic displaced fracture of distal end of radius with routine healing, subsequent encounter    Tubular adenoma of colon    Abdominal mass, LUQ (left upper quadrant)    Uterine mass    Vaginal atrophy    Vitamin D deficiency    Burst fracture of T12 vertebra (Multi)    Distal radius fracture, left    Left wrist pain    Odynophagia    Other intraarticular fracture of lower end of left radius, subsequent encounter for closed fracture with routine healing    Periprosthetic hip fracture, subsequent encounter    AIYANA (generalized anxiety disorder)    OCD (obsessive compulsive disorder)       Follow-up plan for depression was discussed with patient    Review with patient: Treatment plan reviewed with the patient.  Medication risks/benefit  reviewed with the patient  Psychiatric Risk Assessment  Violence Risk Assessment: none  Acute Risk of Harm to Others is Considered: low   Suicide Risk Assessment: age > 65 yrs old and   Protective Factors against Suicide: adherence to  treatment, moral objections to suicide, and social support/connectedness  Acute Risk of Harm to Self is Considered: low    Rukhsana Kim, DO

## 2025-06-23 NOTE — PATIENT INSTRUCTIONS
If there are any thoughts of harming your self, harming others, concerning worsening of your mental health symptoms or concerning medication side effects, please call [phone] or reports to the nearest emergency room.     Contact via [employer] or call sooner ([phone]) in case of any questions or concerns.

## 2025-07-02 DIAGNOSIS — F33.9 RECURRENT MAJOR DEPRESSIVE DISORDER, REMISSION STATUS UNSPECIFIED: ICD-10-CM

## 2025-07-02 DIAGNOSIS — F41.1 GAD (GENERALIZED ANXIETY DISORDER): ICD-10-CM

## 2025-07-02 DIAGNOSIS — F33.40 RECURRENT MAJOR DEPRESSIVE DISORDER, IN REMISSION: ICD-10-CM

## 2025-07-03 DIAGNOSIS — F33.40 RECURRENT MAJOR DEPRESSIVE DISORDER, IN REMISSION: ICD-10-CM

## 2025-07-03 RX ORDER — CITALOPRAM 20 MG/1
20 TABLET ORAL DAILY
Qty: 90 TABLET | Refills: 0 | Status: SHIPPED | OUTPATIENT
Start: 2025-07-03 | End: 2025-10-01

## 2025-07-03 RX ORDER — RISPERIDONE 0.25 MG/1
0.75 TABLET ORAL NIGHTLY
Qty: 270 TABLET | Refills: 0 | Status: SHIPPED | OUTPATIENT
Start: 2025-07-03 | End: 2025-10-01

## 2025-07-03 RX ORDER — RISPERIDONE 0.25 MG/1
0.75 TABLET ORAL NIGHTLY
Qty: 270 TABLET | Refills: 0 | Status: CANCELLED | OUTPATIENT
Start: 2025-07-03 | End: 2025-10-01

## 2025-07-03 RX ORDER — CITALOPRAM 20 MG/1
20 TABLET ORAL DAILY
Qty: 90 TABLET | Refills: 0 | Status: CANCELLED | OUTPATIENT
Start: 2025-07-03 | End: 2025-10-01

## 2025-07-03 RX ORDER — RISPERIDONE 0.25 MG/1
TABLET ORAL
Qty: 270 TABLET | Refills: 2 | OUTPATIENT
Start: 2025-07-03

## 2025-07-03 NOTE — PROGRESS NOTES
Provider has sent the new script to the updated pharmacy, RN has informed caregiver that the medication will be ready for  today.

## 2025-09-29 ENCOUNTER — APPOINTMENT (OUTPATIENT)
Dept: BEHAVIORAL HEALTH | Facility: CLINIC | Age: 70
End: 2025-09-29
Payer: MEDICARE